# Patient Record
Sex: MALE | Race: WHITE | NOT HISPANIC OR LATINO
[De-identification: names, ages, dates, MRNs, and addresses within clinical notes are randomized per-mention and may not be internally consistent; named-entity substitution may affect disease eponyms.]

---

## 2017-07-11 ENCOUNTER — IMAGING SERVICES (OUTPATIENT)
Dept: OTHER | Age: 27
End: 2017-07-11

## 2017-07-11 ENCOUNTER — CHARTING TRANS (OUTPATIENT)
Dept: OTHER | Age: 27
End: 2017-07-11

## 2017-07-11 ASSESSMENT — PAIN SCALES - GENERAL: PAINLEVEL_OUTOF10: 6

## 2017-12-06 ENCOUNTER — IMAGING SERVICES (OUTPATIENT)
Dept: OTHER | Age: 27
End: 2017-12-06

## 2017-12-06 ENCOUNTER — CHARTING TRANS (OUTPATIENT)
Dept: OTHER | Age: 27
End: 2017-12-06

## 2017-12-06 ASSESSMENT — PAIN SCALES - GENERAL: PAINLEVEL_OUTOF10: 8

## 2018-05-25 ENCOUNTER — HOSPITAL (OUTPATIENT)
Dept: OTHER | Age: 28
End: 2018-05-25

## 2018-05-25 ENCOUNTER — IMAGING SERVICES (OUTPATIENT)
Dept: OTHER | Age: 28
End: 2018-05-25

## 2018-08-23 ENCOUNTER — CHARTING TRANS (OUTPATIENT)
Dept: OTHER | Age: 28
End: 2018-08-23

## 2018-11-02 VITALS — TEMPERATURE: 98.5 F | RESPIRATION RATE: 18 BRPM | HEART RATE: 78 BPM

## 2018-11-03 VITALS — TEMPERATURE: 98.5 F | RESPIRATION RATE: 18 BRPM | HEART RATE: 78 BPM

## 2018-11-27 VITALS
OXYGEN SATURATION: 98 % | BODY MASS INDEX: 26.94 KG/M2 | HEIGHT: 74 IN | RESPIRATION RATE: 18 BRPM | HEART RATE: 69 BPM | TEMPERATURE: 98.1 F | WEIGHT: 209.88 LBS

## 2018-12-26 RX ORDER — FLUTICASONE PROPIONATE 50 MCG
SPRAY, SUSPENSION (ML) NASAL
COMMUNITY
Start: 2018-08-23 | End: 2021-04-21

## 2018-12-26 RX ORDER — BENZONATATE 200 MG/1
CAPSULE ORAL
COMMUNITY
Start: 2018-08-23 | End: 2019-08-23

## 2019-01-01 ENCOUNTER — EXTERNAL RECORD (OUTPATIENT)
Dept: HEALTH INFORMATION MANAGEMENT | Facility: OTHER | Age: 29
End: 2019-01-01

## 2019-01-02 ENCOUNTER — OFFICE VISIT (OUTPATIENT)
Dept: FAMILY MEDICINE | Age: 29
End: 2019-01-02

## 2019-01-02 DIAGNOSIS — Z00.00 PREVENTATIVE HEALTH CARE: Primary | ICD-10-CM

## 2019-01-02 DIAGNOSIS — H65.23 BILATERAL CHRONIC SEROUS OTITIS MEDIA: ICD-10-CM

## 2019-01-02 PROCEDURE — 99395 PREV VISIT EST AGE 18-39: CPT | Performed by: FAMILY MEDICINE

## 2019-01-02 RX ORDER — FLUTICASONE PROPIONATE 50 MCG
1 SPRAY, SUSPENSION (ML) NASAL DAILY
Qty: 16 G | Refills: 12 | Status: SHIPPED | OUTPATIENT
Start: 2019-01-02 | End: 2020-04-21

## 2019-01-02 SDOH — HEALTH STABILITY: MENTAL HEALTH: HOW OFTEN DO YOU HAVE A DRINK CONTAINING ALCOHOL?: NEVER

## 2019-01-02 ASSESSMENT — ENCOUNTER SYMPTOMS
CONSTIPATION: 0
DIARRHEA: 0
FEVER: 0
SHORTNESS OF BREATH: 0
ADENOPATHY: 0

## 2019-01-02 ASSESSMENT — PATIENT HEALTH QUESTIONNAIRE - PHQ9
2. FEELING DOWN, DEPRESSED OR HOPELESS: NOT AT ALL
SUM OF ALL RESPONSES TO PHQ9 QUESTIONS 1 AND 2: 0
1. LITTLE INTEREST OR PLEASURE IN DOING THINGS: NOT AT ALL

## 2019-01-02 ASSESSMENT — PAIN SCALES - GENERAL: PAINLEVEL: 0

## 2019-01-24 ENCOUNTER — TELEPHONE (OUTPATIENT)
Dept: FAMILY MEDICINE | Age: 29
End: 2019-01-24

## 2019-01-26 ENCOUNTER — LAB SERVICES (OUTPATIENT)
Dept: LAB | Age: 29
End: 2019-01-26

## 2019-01-26 DIAGNOSIS — Z00.00 PREVENTATIVE HEALTH CARE: ICD-10-CM

## 2019-01-26 LAB
ALBUMIN SERPL-MCNC: 4.1 G/DL (ref 3.6–5.1)
ALBUMIN/GLOB SERPL: 1.3 {RATIO} (ref 1–2.4)
ALP SERPL-CCNC: 74 UNITS/L (ref 45–117)
ALT SERPL-CCNC: 43 UNITS/L
ANION GAP SERPL CALC-SCNC: 12 MMOL/L (ref 10–20)
AST SERPL-CCNC: 22 UNITS/L
BASOPHILS # BLD AUTO: 0.1 K/MCL (ref 0–0.3)
BASOPHILS NFR BLD AUTO: 1 %
BILIRUB SERPL-MCNC: 0.7 MG/DL (ref 0.2–1)
BUN SERPL-MCNC: 20 MG/DL (ref 6–20)
BUN/CREAT SERPL: 15 (ref 7–25)
CALCIUM SERPL-MCNC: 9.1 MG/DL (ref 8.4–10.2)
CHLORIDE SERPL-SCNC: 106 MMOL/L (ref 98–107)
CHOLEST SERPL-MCNC: 189 MG/DL
CHOLEST/HDLC SERPL: 4.4 {RATIO}
CO2 SERPL-SCNC: 27 MMOL/L (ref 21–32)
CREAT SERPL-MCNC: 1.3 MG/DL (ref 0.67–1.17)
DIFFERENTIAL METHOD BLD: NORMAL
EOSINOPHIL # BLD AUTO: 0.1 K/MCL (ref 0.1–0.5)
EOSINOPHIL NFR SPEC: 1 %
ERYTHROCYTE [DISTWIDTH] IN BLOOD: 11.9 % (ref 11–15)
FASTING STATUS PATIENT QL REPORTED: 12 HRS
GLOBULIN SER-MCNC: 3.1 G/DL (ref 2–4)
GLUCOSE SERPL-MCNC: 86 MG/DL (ref 65–99)
HCT VFR BLD CALC: 48.1 % (ref 39–51)
HDLC SERPL-MCNC: 43 MG/DL
HGB BLD-MCNC: 16.5 G/DL (ref 13–17)
IMM GRANULOCYTES # BLD AUTO: 0 K/MCL (ref 0–0.2)
IMM GRANULOCYTES NFR BLD: 0 %
LDLC SERPL-MCNC: 114 MG/DL
LENGTH OF FAST TIME PATIENT: 12 HRS
LYMPHOCYTES # BLD MANUAL: 1.9 K/MCL (ref 1–4.8)
LYMPHOCYTES NFR BLD MANUAL: 22 %
MCH RBC QN AUTO: 30.8 PG (ref 26–34)
MCHC RBC AUTO-ENTMCNC: 34.3 G/DL (ref 32–36.5)
MCV RBC AUTO: 89.7 FL (ref 78–100)
MONOCYTES # BLD MANUAL: 0.7 K/MCL (ref 0.3–0.9)
MONOCYTES NFR BLD MANUAL: 8 %
NEUTROPHILS # BLD: 5.6 K/MCL (ref 1.8–7.7)
NEUTROPHILS NFR BLD AUTO: 68 %
NONHDLC SERPL-MCNC: 146 MG/DL
NRBC BLD MANUAL-RTO: 0 /100 WBC
PLATELET # BLD: 146 K/MCL (ref 140–450)
POTASSIUM SERPL-SCNC: 4.2 MMOL/L (ref 3.4–5.1)
PROT SERPL-MCNC: 7.2 G/DL (ref 6.4–8.2)
RBC # BLD: 5.36 MIL/MCL (ref 4.5–5.9)
SODIUM SERPL-SCNC: 141 MMOL/L (ref 135–145)
TRIGL SERPL-MCNC: 160 MG/DL
WBC # BLD: 8.3 K/MCL (ref 4.2–11)

## 2019-01-26 PROCEDURE — 80061 LIPID PANEL: CPT | Performed by: FAMILY MEDICINE

## 2019-01-26 PROCEDURE — 85025 COMPLETE CBC W/AUTO DIFF WBC: CPT | Performed by: FAMILY MEDICINE

## 2019-01-26 PROCEDURE — 36415 COLL VENOUS BLD VENIPUNCTURE: CPT | Performed by: FAMILY MEDICINE

## 2019-01-26 PROCEDURE — 80053 COMPREHEN METABOLIC PANEL: CPT | Performed by: FAMILY MEDICINE

## 2019-03-20 LAB — PATHOLOGY ICD PATIENT: NORMAL

## 2020-01-01 ENCOUNTER — EXTERNAL RECORD (OUTPATIENT)
Dept: HEALTH INFORMATION MANAGEMENT | Facility: OTHER | Age: 30
End: 2020-01-01

## 2020-01-10 ENCOUNTER — HOSPITAL (OUTPATIENT)
Dept: OTHER | Age: 30
End: 2020-01-10

## 2020-01-10 LAB
ALBUMIN SERPL-MCNC: 4.1 G/DL (ref 3.6–5.1)
ALBUMIN/GLOB SERPL: 1.2 {RATIO} (ref 1–2.4)
ALP SERPL-CCNC: 76 UNITS/L (ref 45–117)
ALT SERPL-CCNC: 38 UNITS/L
AMORPH SED URNS QL MICRO: ABNORMAL
ANION GAP SERPL CALC-SCNC: 7 MMOL/L (ref 10–20)
APPEARANCE UR: CLEAR
AST SERPL-CCNC: 21 UNITS/L
BILIRUB SERPL-MCNC: 0.8 MG/DL (ref 0.2–1)
BILIRUB UR QL: NEGATIVE
BUN SERPL-MCNC: 17 MG/DL (ref 6–20)
BUN/CREAT SERPL: 14 (ref 7–25)
CALCIUM SERPL-MCNC: 9.2 MG/DL (ref 8.4–10.2)
CAOX CRY URNS QL MICRO: ABNORMAL
CHLORIDE SERPL-SCNC: 104 MMOL/L (ref 98–107)
CO2 SERPL-SCNC: 29 MMOL/L (ref 21–32)
COLOR UR: ABNORMAL
CREAT SERPL-MCNC: 1.19 MG/DL (ref 0.67–1.17)
CREAT UR-MCNC: 85.6 MG/DL
EPITH CASTS #/AREA URNS LPF: ABNORMAL /[LPF]
FATTY CASTS #/AREA URNS LPF: ABNORMAL /[LPF]
GLOBULIN SER-MCNC: 3.5 G/DL (ref 2–4)
GLUCOSE SERPL-MCNC: 91 MG/DL (ref 65–99)
GLUCOSE UR-MCNC: NEGATIVE MG/DL
GRAN CASTS #/AREA URNS LPF: ABNORMAL /[LPF]
HGB UR QL: NEGATIVE
HYALINE CASTS #/AREA URNS LPF: ABNORMAL /[LPF]
KETONES UR-MCNC: NEGATIVE MG/DL
LENGTH OF FAST TIME PATIENT: 12 HRS
LEUKOCYTE ESTERASE UR QL STRIP: NEGATIVE
MICROALBUMIN UR-MCNC: <0.5 MG/DL
MICROALBUMIN/CREAT UR: NORMAL MG/G
MICROSCOPIC (MT): ABNORMAL
MIXED CELL CASTS #/AREA URNS LPF: ABNORMAL /[LPF]
MUCOUS THREADS URNS QL MICRO: ABNORMAL
NITRITE UR QL: NEGATIVE
PH UR: 7 UNITS (ref 5–7)
POTASSIUM SERPL-SCNC: 4.3 MMOL/L (ref 3.4–5.1)
PROT SERPL-MCNC: 7.6 G/DL (ref 6.4–8.2)
PROT UR QL: NEGATIVE MG/DL
RBC CASTS #/AREA URNS LPF: ABNORMAL /[LPF]
RENAL EPI CELLS #/AREA URNS HPF: ABNORMAL /[HPF]
SODIUM SERPL-SCNC: 136 MMOL/L (ref 135–145)
SP GR UR: 1.01 (ref 1–1.03)
SPECIMEN SOURCE: ABNORMAL
SPERM URNS QL MICRO: ABNORMAL
T VAGINALIS URNS QL MICRO: ABNORMAL
TRI-PHOS CRY URNS QL MICRO: ABNORMAL
URATE CRY URNS QL MICRO: ABNORMAL
URNS CMNT MICRO: ABNORMAL
UROBILINOGEN UR QL: 0.2 MG/DL (ref 0–1)
WAXY CASTS #/AREA URNS LPF: ABNORMAL /[LPF]
WBC CASTS #/AREA URNS LPF: ABNORMAL /[LPF]
YEAST HYPHAE URNS QL MICRO: ABNORMAL
YEAST URNS QL MICRO: ABNORMAL

## 2020-03-07 ENCOUNTER — HOSPITAL ENCOUNTER (OUTPATIENT)
Age: 30
Discharge: HOME | End: 2020-03-07
Attending: EMERGENCY MEDICINE
Payer: COMMERCIAL

## 2020-03-07 VITALS
BODY MASS INDEX: 26.95 KG/M2 | OXYGEN SATURATION: 99 % | DIASTOLIC BLOOD PRESSURE: 73 MMHG | WEIGHT: 210 LBS | TEMPERATURE: 98 F | HEIGHT: 74 IN | HEART RATE: 73 BPM | SYSTOLIC BLOOD PRESSURE: 121 MMHG | RESPIRATION RATE: 16 BRPM

## 2020-03-07 DIAGNOSIS — J06.9 VIRAL UPPER RESPIRATORY TRACT INFECTION: Primary | ICD-10-CM

## 2020-03-07 LAB — S PYO AG THROAT QL: NEGATIVE

## 2020-03-07 PROCEDURE — 87081 CULTURE SCREEN ONLY: CPT | Performed by: EMERGENCY MEDICINE

## 2020-03-07 PROCEDURE — 99202 OFFICE O/P NEW SF 15 MIN: CPT

## 2020-03-07 PROCEDURE — 87430 STREP A AG IA: CPT

## 2020-03-07 PROCEDURE — 99204 OFFICE O/P NEW MOD 45 MIN: CPT

## 2020-03-07 NOTE — ED INITIAL ASSESSMENT (HPI)
Patient reports he has been exposed to strep. Patient reports his wife was diagnosed with strep and his son has an ear infection.

## 2020-03-07 NOTE — ED PROVIDER NOTES
Patient Seen in: [address]    History   Patient presents with:  Sore Throat    Stated Complaint: sore throat    HPI    Patient here with cough, congestion for 2 days. No travel, no known sick contacts.   Patient denies rhinonsinusitis vs. Bronchitis vs. Pneumonia         ED Course     Labs Reviewed   [facility] POCT RAPID STREP - Normal       MDM     Radiology:        Disposition and Plan     Clinical Impression:  Viral upper respiratory tract infection  (primary encounter diag

## 2020-04-21 RX ORDER — FLUTICASONE PROPIONATE 50 MCG
SPRAY, SUSPENSION (ML) NASAL
Qty: 16 G | Refills: 12 | Status: SHIPPED | OUTPATIENT
Start: 2020-04-21 | End: 2021-07-26

## 2020-12-28 ENCOUNTER — TELEPHONE (OUTPATIENT)
Dept: SCHEDULING | Age: 30
End: 2020-12-28

## 2021-01-06 ENCOUNTER — OFFICE VISIT (OUTPATIENT)
Dept: FAMILY MEDICINE | Age: 31
End: 2021-01-06

## 2021-01-06 DIAGNOSIS — R79.89 ELEVATED SERUM CREATININE: Primary | ICD-10-CM

## 2021-01-06 PROCEDURE — 99395 PREV VISIT EST AGE 18-39: CPT | Performed by: FAMILY MEDICINE

## 2021-01-06 SDOH — HEALTH STABILITY: MENTAL HEALTH: HOW OFTEN DO YOU HAVE A DRINK CONTAINING ALCOHOL?: NEVER

## 2021-01-06 ASSESSMENT — PATIENT HEALTH QUESTIONNAIRE - PHQ9
2. FEELING DOWN, DEPRESSED OR HOPELESS: NOT AT ALL
CLINICAL INTERPRETATION OF PHQ9 SCORE: NO FURTHER SCREENING NEEDED
CLINICAL INTERPRETATION OF PHQ2 SCORE: NO FURTHER SCREENING NEEDED
1. LITTLE INTEREST OR PLEASURE IN DOING THINGS: NOT AT ALL
SUM OF ALL RESPONSES TO PHQ9 QUESTIONS 1 AND 2: 0
SUM OF ALL RESPONSES TO PHQ9 QUESTIONS 1 AND 2: 0

## 2021-01-06 ASSESSMENT — PAIN SCALES - GENERAL: PAINLEVEL: 0

## 2021-01-06 ASSESSMENT — ENCOUNTER SYMPTOMS
SHORTNESS OF BREATH: 0
ADENOPATHY: 0
CONSTIPATION: 0
DIARRHEA: 0
FEVER: 0

## 2021-03-05 ENCOUNTER — TELEPHONE (OUTPATIENT)
Dept: FAMILY MEDICINE | Age: 31
End: 2021-03-05

## 2021-03-09 ENCOUNTER — TELEPHONE (OUTPATIENT)
Dept: ULTRASOUND IMAGING | Age: 31
End: 2021-03-09

## 2021-03-09 DIAGNOSIS — Z00.00 ENCOUNTER FOR SCREENING AND PREVENTATIVE CARE: Primary | ICD-10-CM

## 2021-03-11 ENCOUNTER — LAB SERVICES (OUTPATIENT)
Dept: LAB | Age: 31
End: 2021-03-11

## 2021-03-11 DIAGNOSIS — Z00.00 ENCOUNTER FOR SCREENING AND PREVENTATIVE CARE: ICD-10-CM

## 2021-03-11 LAB — HIV 1+2 AB+HIV1 P24 AG SERPL QL IA: NONREACTIVE

## 2021-03-11 PROCEDURE — 87389 HIV-1 AG W/HIV-1&-2 AB AG IA: CPT | Performed by: FAMILY MEDICINE

## 2021-03-11 PROCEDURE — 36415 COLL VENOUS BLD VENIPUNCTURE: CPT | Performed by: FAMILY MEDICINE

## 2021-05-25 VITALS
BODY MASS INDEX: 28.36 KG/M2 | HEART RATE: 67 BPM | BODY MASS INDEX: 26.95 KG/M2 | TEMPERATURE: 98.3 F | SYSTOLIC BLOOD PRESSURE: 131 MMHG | RESPIRATION RATE: 18 BRPM | HEIGHT: 74 IN | WEIGHT: 210 LBS | DIASTOLIC BLOOD PRESSURE: 78 MMHG | WEIGHT: 221.01 LBS | TEMPERATURE: 97.8 F | OXYGEN SATURATION: 97 % | HEIGHT: 74 IN | HEART RATE: 65 BPM | DIASTOLIC BLOOD PRESSURE: 88 MMHG | SYSTOLIC BLOOD PRESSURE: 136 MMHG | OXYGEN SATURATION: 99 % | RESPIRATION RATE: 18 BRPM

## 2021-07-26 RX ORDER — FLUTICASONE PROPIONATE 50 MCG
SPRAY, SUSPENSION (ML) NASAL
Qty: 16 G | Refills: 3 | Status: SHIPPED | OUTPATIENT
Start: 2021-07-26 | End: 2021-12-23

## 2021-11-11 ENCOUNTER — HOSPITAL ENCOUNTER (OUTPATIENT)
Age: 31
Discharge: HOME | End: 2021-11-11
Attending: EMERGENCY MEDICINE
Payer: COMMERCIAL

## 2021-11-11 VITALS
SYSTOLIC BLOOD PRESSURE: 146 MMHG | DIASTOLIC BLOOD PRESSURE: 84 MMHG | WEIGHT: 215 LBS | TEMPERATURE: 97 F | BODY MASS INDEX: 27.59 KG/M2 | RESPIRATION RATE: 18 BRPM | HEART RATE: 68 BPM | HEIGHT: 74 IN | OXYGEN SATURATION: 99 %

## 2021-11-11 DIAGNOSIS — R53.81 MALAISE: Primary | ICD-10-CM

## 2021-11-11 PROCEDURE — 99203 OFFICE O/P NEW LOW 30 MIN: CPT

## 2021-11-11 PROCEDURE — 84439 ASSAY OF FREE THYROXINE: CPT | Performed by: EMERGENCY MEDICINE

## 2021-11-11 PROCEDURE — 99204 OFFICE O/P NEW MOD 45 MIN: CPT

## 2021-11-11 PROCEDURE — 82550 ASSAY OF CK (CPK): CPT | Performed by: EMERGENCY MEDICINE

## 2021-11-11 PROCEDURE — 93005 ELECTROCARDIOGRAM TRACING: CPT

## 2021-11-11 PROCEDURE — 85025 COMPLETE CBC W/AUTO DIFF WBC: CPT | Performed by: EMERGENCY MEDICINE

## 2021-11-11 PROCEDURE — 93010 ELECTROCARDIOGRAM REPORT: CPT

## 2021-11-11 PROCEDURE — 36415 COLL VENOUS BLD VENIPUNCTURE: CPT

## 2021-11-11 PROCEDURE — 80053 COMPREHEN METABOLIC PANEL: CPT | Performed by: EMERGENCY MEDICINE

## 2021-11-11 PROCEDURE — 84443 ASSAY THYROID STIM HORMONE: CPT | Performed by: EMERGENCY MEDICINE

## 2021-11-11 NOTE — ED PROVIDER NOTES
Patient Seen in: [facility]      History   Patient presents with:  Fatigue    Stated Complaint: headache    Subjective:   HPI    25-year-old male presents to the emergency department complaining of a weeklong history of fogginess and a fe erythema without soft tissue swelling of the left external auditory canal.  No tenderness. Right TM and EAC are normal.  Neck: No adenopathy or thyromegaly. No hoarseness or stridor. Lungs are clear to auscultation.   Heart exam: Normal S1-S2 without ext day            Medications Prescribed:  There are no discharge medications for this patient.

## 2021-11-11 NOTE — ED INITIAL ASSESSMENT (HPI)
Patient presents to IC with c/o feeling \"foggy\"and \"off\"for the last week or so,Stressful job. Very regimented. Denies chest  Pain or sob.

## 2021-12-23 RX ORDER — FLUTICASONE PROPIONATE 50 MCG
SPRAY, SUSPENSION (ML) NASAL
Qty: 16 G | Refills: 3 | Status: SHIPPED | OUTPATIENT
Start: 2021-12-23

## 2022-01-01 ENCOUNTER — HOSPITAL ENCOUNTER (OUTPATIENT)
Age: 32
Discharge: HOME | End: 2022-01-01
Payer: COMMERCIAL

## 2022-01-01 ENCOUNTER — EXTERNAL RECORD (OUTPATIENT)
Dept: OTHER | Age: 32
End: 2022-01-01

## 2022-01-01 DIAGNOSIS — Z20.822 ENCOUNTER FOR LABORATORY TESTING FOR COVID-19 VIRUS: Primary | ICD-10-CM

## 2022-01-01 NOTE — ED INITIAL ASSESSMENT (HPI)
Patient presents for asymptomatic covid testing. Denies any complaints.  States needs test for return to [employer]

## 2022-01-03 LAB — SARS-COV-2 RNA RESP QL NAA+PROBE: NOT DETECTED

## 2022-01-10 ENCOUNTER — OFFICE VISIT (OUTPATIENT)
Dept: FAMILY MEDICINE | Age: 32
End: 2022-01-10

## 2022-01-10 VITALS
HEIGHT: 74 IN | RESPIRATION RATE: 18 BRPM | DIASTOLIC BLOOD PRESSURE: 80 MMHG | SYSTOLIC BLOOD PRESSURE: 127 MMHG | TEMPERATURE: 98.1 F | BODY MASS INDEX: 28.14 KG/M2 | OXYGEN SATURATION: 97 % | WEIGHT: 219.25 LBS | HEART RATE: 60 BPM

## 2022-01-10 DIAGNOSIS — N18.2 CHRONIC KIDNEY DISEASE (CKD), STAGE II (MILD): ICD-10-CM

## 2022-01-10 DIAGNOSIS — Z00.00 PREVENTATIVE HEALTH CARE: Primary | ICD-10-CM

## 2022-01-10 DIAGNOSIS — L98.9 SKIN LESIONS, GENERALIZED: ICD-10-CM

## 2022-01-10 PROCEDURE — 99395 PREV VISIT EST AGE 18-39: CPT | Performed by: FAMILY MEDICINE

## 2022-01-10 RX ORDER — CLINDAMYCIN PHOSPHATE 10 MG/G
GEL TOPICAL
COMMUNITY
Start: 2021-12-23

## 2022-01-10 ASSESSMENT — ENCOUNTER SYMPTOMS
DIARRHEA: 0
SHORTNESS OF BREATH: 0
FEVER: 0
ADENOPATHY: 0
CONSTIPATION: 0

## 2022-01-10 ASSESSMENT — PATIENT HEALTH QUESTIONNAIRE - PHQ9
1. LITTLE INTEREST OR PLEASURE IN DOING THINGS: NOT AT ALL
2. FEELING DOWN, DEPRESSED OR HOPELESS: NOT AT ALL
CLINICAL INTERPRETATION OF PHQ2 SCORE: NO FURTHER SCREENING NEEDED
SUM OF ALL RESPONSES TO PHQ9 QUESTIONS 1 AND 2: 0
SUM OF ALL RESPONSES TO PHQ9 QUESTIONS 1 AND 2: 0

## 2022-01-10 ASSESSMENT — PAIN SCALES - GENERAL: PAINLEVEL: 0

## 2022-02-17 LAB
CHOLEST SERPL-MCNC: 190 MG/DL
CHOLEST/HDLC SERPL: 4.6 (CALC)
HDLC SERPL-MCNC: 41 MG/DL
LDLC SERPL CALC-MCNC: 115 MG/DL (CALC)
LENGTH OF FAST TIME PATIENT: YES H
NONHDLC SERPL-MCNC: 149 MG/DL (CALC)
TRIGL SERPL-MCNC: 227 MG/DL

## 2022-02-22 ENCOUNTER — TELEPHONE (OUTPATIENT)
Dept: FAMILY MEDICINE | Age: 32
End: 2022-02-22

## 2022-03-02 ENCOUNTER — EXTERNAL RECORD (OUTPATIENT)
Dept: HEALTH INFORMATION MANAGEMENT | Facility: OTHER | Age: 32
End: 2022-03-02

## 2022-06-02 ENCOUNTER — OFFICE VISIT (OUTPATIENT)
Dept: INTEGRATIVE MEDICINE | Facility: CLINIC | Age: 32
End: 2022-06-02
Payer: COMMERCIAL

## 2022-06-02 VITALS
BODY MASS INDEX: 27.21 KG/M2 | HEIGHT: 74 IN | DIASTOLIC BLOOD PRESSURE: 64 MMHG | SYSTOLIC BLOOD PRESSURE: 110 MMHG | HEART RATE: 67 BPM | OXYGEN SATURATION: 96 % | WEIGHT: 212 LBS

## 2022-06-02 DIAGNOSIS — H65.93 MIDDLE EAR EFFUSION, BILATERAL: ICD-10-CM

## 2022-06-02 DIAGNOSIS — J30.89 ALLERGIC RHINITIS DUE TO OTHER ALLERGIC TRIGGER, UNSPECIFIED SEASONALITY: ICD-10-CM

## 2022-06-02 DIAGNOSIS — Z30.09 FAMILY PLANNING: ICD-10-CM

## 2022-06-02 DIAGNOSIS — R21 RASH: ICD-10-CM

## 2022-06-02 DIAGNOSIS — Z00.00 WELL ADULT EXAM: Primary | ICD-10-CM

## 2022-06-02 DIAGNOSIS — H65.90 FLUID LEVEL BEHIND TYMPANIC MEMBRANE, UNSPECIFIED LATERALITY: ICD-10-CM

## 2022-06-02 PROCEDURE — 3008F BODY MASS INDEX DOCD: CPT | Performed by: FAMILY MEDICINE

## 2022-06-02 PROCEDURE — 3074F SYST BP LT 130 MM HG: CPT | Performed by: FAMILY MEDICINE

## 2022-06-02 PROCEDURE — 99203 OFFICE O/P NEW LOW 30 MIN: CPT | Performed by: FAMILY MEDICINE

## 2022-06-02 PROCEDURE — 99385 PREV VISIT NEW AGE 18-39: CPT | Performed by: FAMILY MEDICINE

## 2022-06-02 PROCEDURE — 3078F DIAST BP <80 MM HG: CPT | Performed by: FAMILY MEDICINE

## 2022-06-02 RX ORDER — FLUTICASONE PROPIONATE 50 MCG
1 SPRAY, SUSPENSION (ML) NASAL DAILY
COMMUNITY
Start: 2022-05-28

## 2022-06-02 RX ORDER — CLINDAMYCIN PHOSPHATE 10 MG/G
GEL TOPICAL
COMMUNITY
Start: 2022-05-24

## 2022-06-09 ENCOUNTER — LAB ENCOUNTER (OUTPATIENT)
Dept: LAB | Facility: REFERENCE LAB | Age: 32
End: 2022-06-09
Attending: FAMILY MEDICINE
Payer: COMMERCIAL

## 2022-06-09 DIAGNOSIS — R21 RASH: ICD-10-CM

## 2022-06-09 DIAGNOSIS — H65.90 FLUID LEVEL BEHIND TYMPANIC MEMBRANE, UNSPECIFIED LATERALITY: ICD-10-CM

## 2022-06-09 DIAGNOSIS — H65.93 MIDDLE EAR EFFUSION, BILATERAL: ICD-10-CM

## 2022-06-09 DIAGNOSIS — Z00.00 WELL ADULT EXAM: ICD-10-CM

## 2022-06-09 LAB
ALBUMIN SERPL-MCNC: 3.9 G/DL (ref 3.4–5)
ALBUMIN/GLOB SERPL: 1.1 {RATIO} (ref 1–2)
ALP LIVER SERPL-CCNC: 73 U/L
ALT SERPL-CCNC: 40 U/L
ANION GAP SERPL CALC-SCNC: 7 MMOL/L (ref 0–18)
AST SERPL-CCNC: 21 U/L (ref 15–37)
BASOPHILS # BLD AUTO: 0.04 X10(3) UL (ref 0–0.2)
BASOPHILS NFR BLD AUTO: 0.7 %
BILIRUB SERPL-MCNC: 0.7 MG/DL (ref 0.1–2)
BUN BLD-MCNC: 16 MG/DL (ref 7–18)
BUN/CREAT SERPL: 12.6 (ref 10–20)
CALCIUM BLD-MCNC: 9.6 MG/DL (ref 8.5–10.1)
CHLORIDE SERPL-SCNC: 107 MMOL/L (ref 98–112)
CHOLEST SERPL-MCNC: 196 MG/DL (ref ?–200)
CO2 SERPL-SCNC: 26 MMOL/L (ref 21–32)
CREAT BLD-MCNC: 1.27 MG/DL
DEPRECATED RDW RBC AUTO: 41.4 FL (ref 35.1–46.3)
EOSINOPHIL # BLD AUTO: 0.02 X10(3) UL (ref 0–0.7)
EOSINOPHIL NFR BLD AUTO: 0.3 %
ERYTHROCYTE [DISTWIDTH] IN BLOOD BY AUTOMATED COUNT: 12.5 % (ref 11–15)
EST. AVERAGE GLUCOSE BLD GHB EST-MCNC: 114 MG/DL (ref 68–126)
FASTING PATIENT LIPID ANSWER: YES
FASTING STATUS PATIENT QL REPORTED: YES
GLOBULIN PLAS-MCNC: 3.7 G/DL (ref 2.8–4.4)
GLUCOSE BLD-MCNC: 97 MG/DL (ref 70–99)
HBA1C MFR BLD: 5.6 % (ref ?–5.7)
HCT VFR BLD AUTO: 46.6 %
HDLC SERPL-MCNC: 46 MG/DL (ref 40–59)
HGB BLD-MCNC: 15.7 G/DL
IMM GRANULOCYTES # BLD AUTO: 0.01 X10(3) UL (ref 0–1)
IMM GRANULOCYTES NFR BLD: 0.2 %
LDLC SERPL CALC-MCNC: 126 MG/DL (ref ?–100)
LYMPHOCYTES # BLD AUTO: 1.83 X10(3) UL (ref 1–4)
LYMPHOCYTES NFR BLD AUTO: 30.1 %
MCH RBC QN AUTO: 30.7 PG (ref 26–34)
MCHC RBC AUTO-ENTMCNC: 33.7 G/DL (ref 31–37)
MCV RBC AUTO: 91.2 FL
MONOCYTES # BLD AUTO: 0.45 X10(3) UL (ref 0.1–1)
MONOCYTES NFR BLD AUTO: 7.4 %
NEUTROPHILS # BLD AUTO: 3.72 X10 (3) UL (ref 1.5–7.7)
NEUTROPHILS # BLD AUTO: 3.72 X10(3) UL (ref 1.5–7.7)
NEUTROPHILS NFR BLD AUTO: 61.3 %
NONHDLC SERPL-MCNC: 150 MG/DL (ref ?–130)
OSMOLALITY SERPL CALC.SUM OF ELEC: 291 MOSM/KG (ref 275–295)
PLATELET # BLD AUTO: 159 10(3)UL (ref 150–450)
POTASSIUM SERPL-SCNC: 4.6 MMOL/L (ref 3.5–5.1)
PROT SERPL-MCNC: 7.6 G/DL (ref 6.4–8.2)
RBC # BLD AUTO: 5.11 X10(6)UL
SODIUM SERPL-SCNC: 140 MMOL/L (ref 136–145)
T4 FREE SERPL-MCNC: 1 NG/DL (ref 0.8–1.7)
TRIGL SERPL-MCNC: 136 MG/DL (ref 30–149)
TSI SER-ACNC: 1.94 MIU/ML (ref 0.36–3.74)
VIT D+METAB SERPL-MCNC: 62.4 NG/ML (ref 30–100)
VLDLC SERPL CALC-MCNC: 24 MG/DL (ref 0–30)
WBC # BLD AUTO: 6.1 X10(3) UL (ref 4–11)

## 2022-06-09 PROCEDURE — 80053 COMPREHEN METABOLIC PANEL: CPT

## 2022-06-09 PROCEDURE — 85025 COMPLETE CBC W/AUTO DIFF WBC: CPT

## 2022-06-09 PROCEDURE — 84443 ASSAY THYROID STIM HORMONE: CPT

## 2022-06-09 PROCEDURE — 84439 ASSAY OF FREE THYROXINE: CPT

## 2022-06-09 PROCEDURE — 80061 LIPID PANEL: CPT

## 2022-06-09 PROCEDURE — 82306 VITAMIN D 25 HYDROXY: CPT

## 2022-06-09 PROCEDURE — 83036 HEMOGLOBIN GLYCOSYLATED A1C: CPT

## 2022-06-09 PROCEDURE — 86003 ALLG SPEC IGE CRUDE XTRC EA: CPT

## 2022-06-09 PROCEDURE — 82785 ASSAY OF IGE: CPT

## 2022-06-09 PROCEDURE — 36415 COLL VENOUS BLD VENIPUNCTURE: CPT

## 2022-06-13 LAB
CLAM IGE QN: <0.1 KUA/L (ref ?–0.1)
CODFISH IGE QN: <0.1 KUA/L (ref ?–0.1)
CORN IGE QN: <0.1 KUA/L (ref ?–0.1)
COW MILK IGE QN: <0.1 KUA/L (ref ?–0.1)
EGG WHITE IGE QN: <0.1 KUA/L (ref ?–0.1)
IGE SERPL-ACNC: <2 KU/L (ref 2–214)
PEANUT IGE QN: <0.1 KUA/L (ref ?–0.1)
SCALLOP IGE QN: <0.1 KUA/L (ref ?–0.1)
SESAME SEED IGE QN: <0.1 KUA/L (ref ?–0.1)
SHRIMP IGE QN: <0.1 KUA/L (ref ?–0.1)
SOYBEAN IGE QN: <0.1 KUA/L (ref ?–0.1)
WALNUT IGE QN: <0.1 KUA/L (ref ?–0.1)
WHEAT IGE QN: <0.1 KUA/L (ref ?–0.1)

## 2022-06-14 LAB

## 2022-06-21 ENCOUNTER — OFFICE VISIT (OUTPATIENT)
Dept: OTOLARYNGOLOGY | Facility: CLINIC | Age: 32
End: 2022-06-21
Payer: COMMERCIAL

## 2022-06-21 VITALS — TEMPERATURE: 97 F

## 2022-06-21 DIAGNOSIS — R09.82 POSTNASAL DISCHARGE: Primary | ICD-10-CM

## 2022-06-21 DIAGNOSIS — H93.8X3 SENSATION OF FULLNESS IN BOTH EARS: ICD-10-CM

## 2022-06-21 DIAGNOSIS — J34.2 DEVIATED NASAL SEPTUM: ICD-10-CM

## 2022-06-21 PROCEDURE — 99203 OFFICE O/P NEW LOW 30 MIN: CPT | Performed by: OTOLARYNGOLOGY

## 2022-06-21 RX ORDER — MONTELUKAST SODIUM 10 MG/1
10 TABLET ORAL NIGHTLY
Qty: 30 TABLET | Refills: 3 | Status: SHIPPED | OUTPATIENT
Start: 2022-06-21

## 2022-06-21 RX ORDER — PSEUDOEPHEDRINE HCL 120 MG/1
120 TABLET, FILM COATED, EXTENDED RELEASE ORAL EVERY 12 HOURS
Qty: 60 TABLET | Refills: 3 | Status: SHIPPED | OUTPATIENT
Start: 2022-06-21

## 2022-06-21 RX ORDER — AZELASTINE 1 MG/ML
2 SPRAY, METERED NASAL 2 TIMES DAILY
Qty: 30 ML | Refills: 3 | Status: SHIPPED | OUTPATIENT
Start: 2022-06-21

## 2022-07-01 ENCOUNTER — TELEMEDICINE (OUTPATIENT)
Dept: INTEGRATIVE MEDICINE | Facility: CLINIC | Age: 32
End: 2022-07-01

## 2022-07-01 RX ORDER — PSEUDOEPHEDRINE HCL 120 MG/1
TABLET, FILM COATED, EXTENDED RELEASE ORAL
COMMUNITY
Start: 2022-06-21 | End: 2022-07-01

## 2022-07-15 ENCOUNTER — TELEMEDICINE (OUTPATIENT)
Dept: INTEGRATIVE MEDICINE | Facility: CLINIC | Age: 32
End: 2022-07-15

## 2022-07-15 DIAGNOSIS — J30.89 ALLERGIC RHINITIS DUE TO OTHER ALLERGIC TRIGGER, UNSPECIFIED SEASONALITY: Primary | ICD-10-CM

## 2022-07-15 DIAGNOSIS — D80.3 SELECTIVE DEFICIENCY OF IGG (HCC): ICD-10-CM

## 2022-07-15 DIAGNOSIS — H65.93 MIDDLE EAR EFFUSION, BILATERAL: ICD-10-CM

## 2022-07-15 PROCEDURE — 99213 OFFICE O/P EST LOW 20 MIN: CPT | Performed by: FAMILY MEDICINE

## 2022-07-15 RX ORDER — CLINDAMYCIN PHOSPHATE 10 UG/ML
LOTION TOPICAL
COMMUNITY
Start: 2022-07-12

## 2022-07-18 ENCOUNTER — TELEPHONE (OUTPATIENT)
Dept: INTEGRATIVE MEDICINE | Facility: CLINIC | Age: 32
End: 2022-07-18

## 2022-07-18 ENCOUNTER — PATIENT MESSAGE (OUTPATIENT)
Dept: INTEGRATIVE MEDICINE | Facility: CLINIC | Age: 32
End: 2022-07-18

## 2022-07-18 DIAGNOSIS — J32.9 RECURRENT SINUS INFECTIONS: Primary | ICD-10-CM

## 2022-07-18 DIAGNOSIS — D80.3 SELECTIVE DEFICIENCY OF IGG (HCC): ICD-10-CM

## 2022-07-18 NOTE — TELEPHONE ENCOUNTER
Labs in database - they were under preference lab once you enter the codes. I've pended them for you to review and submit. You can delete \" [id number] [facility] \" order so there's no confusion with the patient or . Per Elbert Valentin from lab - if you use these often, please save them to your favorites.

## 2022-07-19 NOTE — TELEPHONE ENCOUNTER
From: Miki Nissen  To: Wilman Samaniego,   Sent: 7/18/2022 8:48 PM CDT  Subject: Marleni Garcia follow up visit     Dr Precious Gilbert, has my blood test request been sent in so I can go at anytime? Also, what was the nasal spray you recommended?   Thanks

## 2022-07-22 ENCOUNTER — OFFICE VISIT (OUTPATIENT)
Dept: OTOLARYNGOLOGY | Facility: CLINIC | Age: 32
End: 2022-07-22
Payer: COMMERCIAL

## 2022-07-22 VITALS — WEIGHT: 212 LBS | HEIGHT: 74 IN | BODY MASS INDEX: 27.21 KG/M2

## 2022-07-22 DIAGNOSIS — J34.2 DEVIATED NASAL SEPTUM: ICD-10-CM

## 2022-07-22 DIAGNOSIS — H93.8X3 SENSATION OF FULLNESS IN BOTH EARS: ICD-10-CM

## 2022-07-22 DIAGNOSIS — R09.82 POSTNASAL DISCHARGE: Primary | ICD-10-CM

## 2022-07-22 PROCEDURE — 99213 OFFICE O/P EST LOW 20 MIN: CPT | Performed by: OTOLARYNGOLOGY

## 2022-07-22 PROCEDURE — 3008F BODY MASS INDEX DOCD: CPT | Performed by: OTOLARYNGOLOGY

## 2022-07-26 ENCOUNTER — LAB ENCOUNTER (OUTPATIENT)
Dept: LAB | Facility: REFERENCE LAB | Age: 32
End: 2022-07-26
Attending: FAMILY MEDICINE
Payer: COMMERCIAL

## 2022-07-26 DIAGNOSIS — D80.3 SELECTIVE DEFICIENCY OF IGG (HCC): ICD-10-CM

## 2022-07-26 DIAGNOSIS — J32.9 RECURRENT SINUS INFECTIONS: ICD-10-CM

## 2022-07-26 LAB
IGA SERPL-MCNC: 297 MG/DL (ref 70–312)
IGM SERPL-MCNC: 74.7 MG/DL (ref 43–279)
IMMUNOGLOBULIN PNL SER-MCNC: 1150 MG/DL (ref 791–1643)

## 2022-07-26 PROCEDURE — 36415 COLL VENOUS BLD VENIPUNCTURE: CPT | Performed by: FAMILY MEDICINE

## 2022-07-26 PROCEDURE — 82784 ASSAY IGA/IGD/IGG/IGM EACH: CPT | Performed by: FAMILY MEDICINE

## 2022-07-26 PROCEDURE — 82787 IGG 1 2 3 OR 4 EACH: CPT

## 2022-07-26 PROCEDURE — 82784 ASSAY IGA/IGD/IGG/IGM EACH: CPT

## 2022-07-29 LAB
IMMUNOGLOBULIN G SUBCLASS 1: 657 MG/DL
IMMUNOGLOBULIN G SUBCLASS 2: 296 MG/DL
IMMUNOGLOBULIN G SUBCLASS 3: 65 MG/DL
IMMUNOGLOBULIN G SUBCLASS 4: 11 MG/DL

## 2022-08-09 ENCOUNTER — OFFICE VISIT (OUTPATIENT)
Dept: SURGERY | Facility: CLINIC | Age: 32
End: 2022-08-09
Payer: COMMERCIAL

## 2022-08-09 DIAGNOSIS — Z30.09 STERILIZATION CONSULT: Primary | ICD-10-CM

## 2022-08-09 PROCEDURE — 99243 OFF/OP CNSLTJ NEW/EST LOW 30: CPT | Performed by: UROLOGY

## 2022-08-16 ENCOUNTER — LAB REQUISITION (OUTPATIENT)
Dept: LAB | Facility: HOSPITAL | Age: 32
End: 2022-08-16
Payer: COMMERCIAL

## 2022-08-16 ENCOUNTER — PATIENT MESSAGE (OUTPATIENT)
Dept: INTEGRATIVE MEDICINE | Facility: CLINIC | Age: 32
End: 2022-08-16

## 2022-08-16 DIAGNOSIS — L70.9 ACNE, UNSPECIFIED ACNE TYPE: Primary | ICD-10-CM

## 2022-08-16 DIAGNOSIS — D22.9 NEVUS: ICD-10-CM

## 2022-08-16 DIAGNOSIS — D22.5 MELANOCYTIC NEVI OF TRUNK: ICD-10-CM

## 2022-08-16 PROCEDURE — 88305 TISSUE EXAM BY PATHOLOGIST: CPT | Performed by: DERMATOLOGY

## 2022-08-16 PROCEDURE — 88341 IMHCHEM/IMCYTCHM EA ADD ANTB: CPT | Performed by: DERMATOLOGY

## 2022-08-16 PROCEDURE — 88342 IMHCHEM/IMCYTCHM 1ST ANTB: CPT | Performed by: DERMATOLOGY

## 2022-08-17 NOTE — TELEPHONE ENCOUNTER
From: Sukhwinder Greco  To: Wade Mariano DO  Sent: 8/16/2022 8:24 PM CDT  Subject: Dermatologist Referral     Dr Rigo Kwon, I met with Dr Axel Pennington today and need a follow up referral for 6 weeks out. I have an appointment September 27th at 4:30pm. I am getting 2 moles removed and discussing acne medication can you please send me a referral for this?      Thanks

## 2022-08-30 ENCOUNTER — PATIENT MESSAGE (OUTPATIENT)
Dept: OTOLARYNGOLOGY | Facility: CLINIC | Age: 32
End: 2022-08-30

## 2022-08-30 NOTE — TELEPHONE ENCOUNTER
From: Radha Green  To: Adrianna Byrd. Darlene Halsted, MD  Sent: 8/30/2022 1:18 PM CDT  Subject: Prescription     Did Brandie contact you about my prescription refill? From our last visit in July you said come back in 6 months which j need to schedule for end of December or early January but I would have refills until the. Please let me know.      Thanks

## 2022-09-02 RX ORDER — PSEUDOEPHEDRINE HCL 120 MG/1
TABLET, FILM COATED, EXTENDED RELEASE ORAL
Qty: 60 TABLET | Refills: 0 | Status: SHIPPED | OUTPATIENT
Start: 2022-09-02

## 2022-09-02 RX ORDER — PSEUDOEPHEDRINE HCL 120 MG/1
120 TABLET, FILM COATED, EXTENDED RELEASE ORAL EVERY 12 HOURS
Qty: 60 TABLET | Refills: 3 | Status: SHIPPED | OUTPATIENT
Start: 2022-09-02

## 2022-09-19 ENCOUNTER — PATIENT MESSAGE (OUTPATIENT)
Dept: OTOLARYNGOLOGY | Facility: CLINIC | Age: 32
End: 2022-09-19

## 2022-09-19 RX ORDER — MONTELUKAST SODIUM 10 MG/1
TABLET ORAL
Qty: 90 TABLET | Refills: 0 | Status: SHIPPED | OUTPATIENT
Start: 2022-09-19

## 2022-09-20 ENCOUNTER — PATIENT MESSAGE (OUTPATIENT)
Dept: OTOLARYNGOLOGY | Facility: CLINIC | Age: 32
End: 2022-09-20

## 2022-09-20 RX ORDER — MONTELUKAST SODIUM 10 MG/1
10 TABLET ORAL NIGHTLY
Qty: 90 TABLET | Refills: 1 | Status: SHIPPED | OUTPATIENT
Start: 2022-09-20

## 2022-09-20 NOTE — TELEPHONE ENCOUNTER
From: Nickolas Sahu  Sent: 9/20/2022 8:32 AM CDT  To: Em Ent Clinical Staff  Subject: Prescription     Correct. Brandie called yesterday and they said the doctor denied this so can you please contact Brandie and approve this? Also, Dr Eddie Salas told me to come back in 6 months so why would I only get a 3 month supply? Thank you.

## 2022-09-20 NOTE — TELEPHONE ENCOUNTER
From: Don Quiñones  Sent: 9/19/2022 5:01 PM CDT  To: Em Ent Clinical Staff  Subject: Prescription     Brandie Just called and said my prescription was denied.  Why is this denied and I should have a six month supply based on my last appointment with Dr. Bridget Carpenter

## 2022-10-17 ENCOUNTER — PATIENT MESSAGE (OUTPATIENT)
Dept: OTOLARYNGOLOGY | Facility: CLINIC | Age: 32
End: 2022-10-17

## 2022-10-17 RX ORDER — AZELASTINE 1 MG/ML
SPRAY, METERED NASAL
Qty: 90 ML | Refills: 1 | Status: SHIPPED | OUTPATIENT
Start: 2022-10-17

## 2022-10-17 NOTE — TELEPHONE ENCOUNTER
Pt calling to speak to RN regarding refills. Pt is frustrated that he is needing to contact office everytime he is needing refill, asking if multiple refills can be added to rx. Also see [employer] messages from 8/30/22.  Please advise

## 2022-10-17 NOTE — TELEPHONE ENCOUNTER
Dr. Nikko Khan, spoke with patient and requesting pseudofed refill. Pended Rx. Also spoke with him and explained refill process. Scheduled FU for 12/6/22.
From: Denise Raymnod  Sent: 10/17/2022 3:55 PM CDT  To: Em Ent Clinical Staff  Subject: Prescription     Shaye- please call me. [phone].  This is very frustrating
Satisfactory

## 2022-10-18 ENCOUNTER — TELEPHONE (OUTPATIENT)
Dept: OTOLARYNGOLOGY | Facility: CLINIC | Age: 32
End: 2022-10-18

## 2022-10-18 RX ORDER — PSEUDOEPHEDRINE HCL 120 MG/1
120 TABLET, FILM COATED, EXTENDED RELEASE ORAL EVERY 12 HOURS
Qty: 60 TABLET | Refills: 5 | Status: SHIPPED | OUTPATIENT
Start: 2022-10-18

## 2022-10-18 NOTE — TELEPHONE ENCOUNTER
CONTROLLED SUBSTANCE SECOND PRESCRIPTION REQUEST FOR     WAL-PHED 12 HOUR CAPLETS 2\"S  TAKE 1 TABLET BY MOUTH EVERY 12 HOURS.   QUANT-60   LAST REFILL 09/13/22

## 2022-11-01 PROCEDURE — 88305 TISSUE EXAM BY PATHOLOGIST: CPT | Performed by: DERMATOLOGY

## 2022-11-02 NOTE — PROGRESS NOTES
The patient had been previously counseled and examined. The procedure of male sterilization was thoroughly discussed with the patient including alternatives, risks, benefits and complications and he understands and wishes to proceed. The patient was brought into the procedure room and placed on the examining table in a supine position. The scrotum was then prepped and then draped in the usual fashion. The right vas deferens was then identified first and approximately 7 cc of 1% Xylocaine was infiltrated overlying the right vas and into the subcutaneous tissues surrounding it. Then when the anesthetic had taken its effect a 1 cm incision was then made through the scrotum overlying the right vas deferens and carried down through the subcutaneous tissues exposing the vas deferens itself. The vas deferens was then carefully dissected from its sheath and a 1 cm segment of the vas deferens was then resected. Hemostats had been placed over each end of the vas deferens. Each end of the vas deferens was lightly cauterized with a hand-held cautery. 2 titanium clips were placed across the proximal end of the vas deferens and the single clip was placed across the distal end. The proximal end of the vas deferens was covered over with the adventitial tissues of the sheath of the vas deferens. Hemostasis was meticulous. The skin edges were then closed using interrupted 4-0 chromic sutures. Then a similar procedure was performed on the left side in the same manner. The specimens were inspected and then discarded. The scrotum was then cleaned with sterile water, bacitracin ointment applied over the incisions, and then sterile dressings. The patient had tolerated the procedure well and was discharged home with appropriate pain medication as needed, the usual postoperative instructions, and advised to obtain a post vasectomy semen sample in 3 or 4 months. I offered follow-up examination as needed.

## 2022-11-03 ENCOUNTER — LAB REQUISITION (OUTPATIENT)
Dept: LAB | Facility: HOSPITAL | Age: 32
End: 2022-11-03
Payer: COMMERCIAL

## 2022-11-03 ENCOUNTER — PROCEDURE (OUTPATIENT)
Dept: SURGERY | Facility: CLINIC | Age: 32
End: 2022-11-03
Payer: COMMERCIAL

## 2022-11-03 DIAGNOSIS — Z30.2 ENCOUNTER FOR STERILIZATION: Primary | ICD-10-CM

## 2022-11-03 DIAGNOSIS — L29.8 OTHER PRURITUS: ICD-10-CM

## 2022-11-03 DIAGNOSIS — D22.61 MELANOCYTIC NEVI OF RIGHT UPPER LIMB, INCLUDING SHOULDER: ICD-10-CM

## 2022-11-03 PROCEDURE — 55250 REMOVAL OF SPERM DUCT(S): CPT | Performed by: UROLOGY

## 2022-11-03 RX ORDER — HYDROCODONE BITARTRATE AND ACETAMINOPHEN 5; 325 MG/1; MG/1
1-2 TABLET ORAL EVERY 4 HOURS PRN
Qty: 20 TABLET | Refills: 0 | Status: SHIPPED | OUTPATIENT
Start: 2022-11-03

## 2022-11-05 ENCOUNTER — HOSPITAL ENCOUNTER (EMERGENCY)
Facility: HOSPITAL | Age: 32
Discharge: HOME | End: 2022-11-05
Attending: EMERGENCY MEDICINE
Payer: COMMERCIAL

## 2022-11-05 VITALS
SYSTOLIC BLOOD PRESSURE: 125 MMHG | RESPIRATION RATE: 18 BRPM | TEMPERATURE: 97 F | HEART RATE: 75 BPM | DIASTOLIC BLOOD PRESSURE: 76 MMHG | OXYGEN SATURATION: 97 %

## 2022-11-05 DIAGNOSIS — T14.8XXA BLEEDING FROM WOUND: Primary | ICD-10-CM

## 2022-11-05 PROCEDURE — 99283 EMERGENCY DEPT VISIT LOW MDM: CPT

## 2022-11-05 PROCEDURE — 30901 CONTROL OF NOSEBLEED: CPT

## 2022-11-05 RX ORDER — TRANEXAMIC ACID 100 MG/ML
5 INJECTION, SOLUTION INTRAVENOUS ONCE
Status: COMPLETED | OUTPATIENT
Start: 2022-11-05 | End: 2022-11-05

## 2022-11-05 RX ORDER — TRANEXAMIC ACID 100 MG/ML
INJECTION, SOLUTION INTRAVENOUS
Status: COMPLETED
Start: 2022-11-05 | End: 2022-11-05

## 2022-11-05 NOTE — ED INITIAL ASSESSMENT (HPI)
Vasectomy on Thursday and noticed some bleeding today. Spoke with Md and was told to come to ER to have a stitch placed.

## 2022-11-07 ENCOUNTER — TELEPHONE (OUTPATIENT)
Dept: OTOLARYNGOLOGY | Facility: CLINIC | Age: 32
End: 2022-11-07

## 2022-11-08 ENCOUNTER — PATIENT MESSAGE (OUTPATIENT)
Dept: OTOLARYNGOLOGY | Facility: CLINIC | Age: 32
End: 2022-11-08

## 2022-11-08 RX ORDER — MONTELUKAST SODIUM 10 MG/1
10 TABLET ORAL NIGHTLY
Qty: 30 TABLET | Refills: 0 | Status: SHIPPED | OUTPATIENT
Start: 2022-11-08

## 2022-11-08 NOTE — TELEPHONE ENCOUNTER
Patient says that he has been taking montelukast twice daily by accident so he only has 3-4 tablets left. He understands it should just be once daily. Asking for 1 month supply sent to pharmacy. Rx sent per protocol.

## 2022-12-06 ENCOUNTER — OFFICE VISIT (OUTPATIENT)
Dept: OTOLARYNGOLOGY | Facility: CLINIC | Age: 32
End: 2022-12-06
Payer: COMMERCIAL

## 2022-12-06 VITALS — DIASTOLIC BLOOD PRESSURE: 80 MMHG | SYSTOLIC BLOOD PRESSURE: 127 MMHG | HEART RATE: 60 BPM

## 2022-12-06 DIAGNOSIS — R09.82 POSTNASAL DISCHARGE: Primary | ICD-10-CM

## 2022-12-06 PROCEDURE — 99213 OFFICE O/P EST LOW 20 MIN: CPT | Performed by: OTOLARYNGOLOGY

## 2022-12-06 PROCEDURE — 3074F SYST BP LT 130 MM HG: CPT | Performed by: OTOLARYNGOLOGY

## 2022-12-06 PROCEDURE — 3079F DIAST BP 80-89 MM HG: CPT | Performed by: OTOLARYNGOLOGY

## 2022-12-06 RX ORDER — AZELASTINE 1 MG/ML
2 SPRAY, METERED NASAL 2 TIMES DAILY
Qty: 3 EACH | Refills: 3 | Status: SHIPPED | OUTPATIENT
Start: 2022-12-06

## 2022-12-06 RX ORDER — PSEUDOEPHEDRINE HCL 120 MG/1
120 TABLET, FILM COATED, EXTENDED RELEASE ORAL EVERY 12 HOURS
Qty: 60 TABLET | Refills: 3 | Status: SHIPPED | OUTPATIENT
Start: 2022-12-06

## 2022-12-06 RX ORDER — MONTELUKAST SODIUM 10 MG/1
10 TABLET ORAL NIGHTLY
Qty: 90 TABLET | Refills: 3 | Status: SHIPPED | OUTPATIENT
Start: 2022-12-06

## 2023-02-07 ENCOUNTER — LAB ENCOUNTER (OUTPATIENT)
Dept: LAB | Facility: HOSPITAL | Age: 33
End: 2023-02-07
Attending: PHYSICIAN ASSISTANT
Payer: COMMERCIAL

## 2023-02-07 ENCOUNTER — TELEPHONE (OUTPATIENT)
Dept: SURGERY | Facility: CLINIC | Age: 33
End: 2023-02-07

## 2023-02-07 DIAGNOSIS — Z98.52 S/P VASECTOMY: ICD-10-CM

## 2023-02-07 DIAGNOSIS — Z98.52 S/P VASECTOMY: Primary | ICD-10-CM

## 2023-02-07 PROCEDURE — 89321 SEMEN ANAL SPERM DETECTION: CPT

## 2023-02-07 NOTE — TELEPHONE ENCOUNTER
Spoke with Carla Benedict. I advised her I will put order in for post vasectomy. I do not see order was placed at time of procedure. Copy of LOV notes from 11/3/22 procedure below. \". Martin Bolden The patient had tolerated the procedure well and was discharged home with appropriate pain medication as needed, the usual postoperative instructions, and advised to obtain a post vasectomy semen sample in 3 or 4 months. I offered follow-up examination as needed. \"

## 2023-05-04 ENCOUNTER — OFFICE VISIT (OUTPATIENT)
Dept: INTEGRATIVE MEDICINE | Facility: CLINIC | Age: 33
End: 2023-05-04
Payer: COMMERCIAL

## 2023-05-04 VITALS
WEIGHT: 211.81 LBS | BODY MASS INDEX: 27 KG/M2 | SYSTOLIC BLOOD PRESSURE: 118 MMHG | HEART RATE: 66 BPM | OXYGEN SATURATION: 96 % | DIASTOLIC BLOOD PRESSURE: 80 MMHG

## 2023-05-04 DIAGNOSIS — M25.512 LEFT SHOULDER PAIN, UNSPECIFIED CHRONICITY: Primary | ICD-10-CM

## 2023-05-04 PROCEDURE — 99214 OFFICE O/P EST MOD 30 MIN: CPT | Performed by: FAMILY MEDICINE

## 2023-05-04 PROCEDURE — 3079F DIAST BP 80-89 MM HG: CPT | Performed by: FAMILY MEDICINE

## 2023-05-04 PROCEDURE — 3074F SYST BP LT 130 MM HG: CPT | Performed by: FAMILY MEDICINE

## 2023-05-04 RX ORDER — CEPHALEXIN 500 MG/1
CAPSULE ORAL
COMMUNITY
Start: 2023-04-11

## 2023-06-29 ENCOUNTER — OFFICE VISIT (OUTPATIENT)
Dept: INTEGRATIVE MEDICINE | Facility: CLINIC | Age: 33
End: 2023-06-29
Payer: COMMERCIAL

## 2023-06-29 VITALS
HEART RATE: 76 BPM | BODY MASS INDEX: 27 KG/M2 | DIASTOLIC BLOOD PRESSURE: 80 MMHG | OXYGEN SATURATION: 96 % | SYSTOLIC BLOOD PRESSURE: 110 MMHG | WEIGHT: 212.63 LBS

## 2023-06-29 DIAGNOSIS — H61.22 IMPACTED CERUMEN OF LEFT EAR: ICD-10-CM

## 2023-06-29 DIAGNOSIS — J30.89 ALLERGIC RHINITIS DUE TO OTHER ALLERGIC TRIGGER, UNSPECIFIED SEASONALITY: ICD-10-CM

## 2023-06-29 DIAGNOSIS — Z00.00 WELL ADULT EXAM: Primary | ICD-10-CM

## 2023-06-29 PROCEDURE — 3074F SYST BP LT 130 MM HG: CPT | Performed by: FAMILY MEDICINE

## 2023-06-29 PROCEDURE — 3079F DIAST BP 80-89 MM HG: CPT | Performed by: FAMILY MEDICINE

## 2023-06-29 PROCEDURE — 69210 REMOVE IMPACTED EAR WAX UNI: CPT | Performed by: FAMILY MEDICINE

## 2023-06-29 PROCEDURE — 99395 PREV VISIT EST AGE 18-39: CPT | Performed by: FAMILY MEDICINE

## 2023-06-29 RX ORDER — TAZAROTENE 1 MG/G
CREAM TOPICAL
COMMUNITY
Start: 2023-06-13

## 2023-06-29 RX ORDER — CIPROFLOXACIN AND DEXAMETHASONE 3; 1 MG/ML; MG/ML
4 SUSPENSION/ DROPS AURICULAR (OTIC) 2 TIMES DAILY
Qty: 7.5 ML | Refills: 0 | Status: SHIPPED | OUTPATIENT
Start: 2023-06-29 | End: 2023-07-06

## 2023-06-29 RX ORDER — DOXYCYCLINE HYCLATE 100 MG/1
100 CAPSULE ORAL 2 TIMES DAILY
COMMUNITY
Start: 2023-06-13

## 2023-07-11 ENCOUNTER — HOSPITAL ENCOUNTER (OUTPATIENT)
Age: 33
Discharge: HOME | End: 2023-07-11
Payer: COMMERCIAL

## 2023-07-11 VITALS
TEMPERATURE: 97 F | SYSTOLIC BLOOD PRESSURE: 109 MMHG | DIASTOLIC BLOOD PRESSURE: 85 MMHG | RESPIRATION RATE: 20 BRPM | OXYGEN SATURATION: 100 % | HEART RATE: 74 BPM

## 2023-07-11 DIAGNOSIS — M54.40 BACK PAIN OF LUMBAR REGION WITH SCIATICA: Primary | ICD-10-CM

## 2023-07-11 PROCEDURE — 99213 OFFICE O/P EST LOW 20 MIN: CPT | Performed by: NURSE PRACTITIONER

## 2023-07-11 RX ORDER — CYCLOBENZAPRINE HCL 10 MG
10 TABLET ORAL 3 TIMES DAILY PRN
Qty: 20 TABLET | Refills: 0 | Status: SHIPPED | OUTPATIENT
Start: 2023-07-11 | End: 2023-07-18

## 2023-07-11 RX ORDER — PREDNISONE 20 MG/1
60 TABLET ORAL DAILY
Qty: 21 TABLET | Refills: 0 | Status: SHIPPED | OUTPATIENT
Start: 2023-07-11 | End: 2023-07-18

## 2023-07-11 NOTE — ED INITIAL ASSESSMENT (HPI)
Pt c/o lower back pain x 4 days. Denies injury, does states he has been doing outdoor activities in the last 1 week.

## 2023-07-27 ENCOUNTER — LAB ENCOUNTER (OUTPATIENT)
Dept: LAB | Facility: REFERENCE LAB | Age: 33
End: 2023-07-27
Attending: FAMILY MEDICINE
Payer: COMMERCIAL

## 2023-07-27 DIAGNOSIS — Z00.00 WELL ADULT EXAM: ICD-10-CM

## 2023-07-27 DIAGNOSIS — J30.89 ALLERGIC RHINITIS DUE TO OTHER ALLERGIC TRIGGER, UNSPECIFIED SEASONALITY: ICD-10-CM

## 2023-07-27 LAB
ALBUMIN SERPL-MCNC: 3.6 G/DL (ref 3.4–5)
ALBUMIN/GLOB SERPL: 1.1 {RATIO} (ref 1–2)
ALP LIVER SERPL-CCNC: 55 U/L
ALT SERPL-CCNC: 64 U/L
ANION GAP SERPL CALC-SCNC: 4 MMOL/L (ref 0–18)
AST SERPL-CCNC: 31 U/L (ref 15–37)
BASOPHILS # BLD AUTO: 0.04 X10(3) UL (ref 0–0.2)
BASOPHILS NFR BLD AUTO: 0.8 %
BILIRUB SERPL-MCNC: 0.8 MG/DL (ref 0.1–2)
BUN BLD-MCNC: 13 MG/DL (ref 7–18)
BUN/CREAT SERPL: 10.3 (ref 10–20)
CALCIUM BLD-MCNC: 9.5 MG/DL (ref 8.5–10.1)
CHLORIDE SERPL-SCNC: 106 MMOL/L (ref 98–112)
CHOLEST SERPL-MCNC: 247 MG/DL (ref ?–200)
CO2 SERPL-SCNC: 29 MMOL/L (ref 21–32)
CREAT BLD-MCNC: 1.26 MG/DL
CRP SERPL HS-MCNC: 0.4 MG/L (ref ?–3)
DEPRECATED RDW RBC AUTO: 41.1 FL (ref 35.1–46.3)
EGFRCR SERPLBLD CKD-EPI 2021: 76 ML/MIN/1.73M2 (ref 60–?)
EOSINOPHIL # BLD AUTO: 0.04 X10(3) UL (ref 0–0.7)
EOSINOPHIL NFR BLD AUTO: 0.8 %
ERYTHROCYTE [DISTWIDTH] IN BLOOD BY AUTOMATED COUNT: 12.4 % (ref 11–15)
EST. AVERAGE GLUCOSE BLD GHB EST-MCNC: 117 MG/DL (ref 68–126)
FASTING PATIENT LIPID ANSWER: YES
FASTING STATUS PATIENT QL REPORTED: YES
GLOBULIN PLAS-MCNC: 3.3 G/DL (ref 2.8–4.4)
GLUCOSE BLD-MCNC: 87 MG/DL (ref 70–99)
HBA1C MFR BLD: 5.7 % (ref ?–5.7)
HCT VFR BLD AUTO: 47.9 %
HDLC SERPL-MCNC: 48 MG/DL (ref 40–59)
HGB BLD-MCNC: 16.3 G/DL
IMM GRANULOCYTES # BLD AUTO: 0.01 X10(3) UL (ref 0–1)
IMM GRANULOCYTES NFR BLD: 0.2 %
LDLC SERPL CALC-MCNC: 165 MG/DL (ref ?–100)
LYMPHOCYTES # BLD AUTO: 1.72 X10(3) UL (ref 1–4)
LYMPHOCYTES NFR BLD AUTO: 33.8 %
MCH RBC QN AUTO: 30.9 PG (ref 26–34)
MCHC RBC AUTO-ENTMCNC: 34 G/DL (ref 31–37)
MCV RBC AUTO: 90.9 FL
MONOCYTES # BLD AUTO: 0.46 X10(3) UL (ref 0.1–1)
MONOCYTES NFR BLD AUTO: 9 %
NEUTROPHILS # BLD AUTO: 2.82 X10 (3) UL (ref 1.5–7.7)
NEUTROPHILS # BLD AUTO: 2.82 X10(3) UL (ref 1.5–7.7)
NEUTROPHILS NFR BLD AUTO: 55.4 %
NONHDLC SERPL-MCNC: 199 MG/DL (ref ?–130)
OSMOLALITY SERPL CALC.SUM OF ELEC: 287 MOSM/KG (ref 275–295)
PLATELET # BLD AUTO: 122 10(3)UL (ref 150–450)
POTASSIUM SERPL-SCNC: 4.5 MMOL/L (ref 3.5–5.1)
PROT SERPL-MCNC: 6.9 G/DL (ref 6.4–8.2)
RBC # BLD AUTO: 5.27 X10(6)UL
SODIUM SERPL-SCNC: 139 MMOL/L (ref 136–145)
TRIGL SERPL-MCNC: 186 MG/DL (ref 30–149)
TSI SER-ACNC: 1.72 MIU/ML (ref 0.36–3.74)
VIT D+METAB SERPL-MCNC: 59.5 NG/ML (ref 30–100)
VLDLC SERPL CALC-MCNC: 37 MG/DL (ref 0–30)
WBC # BLD AUTO: 5.1 X10(3) UL (ref 4–11)

## 2023-07-27 PROCEDURE — 83036 HEMOGLOBIN GLYCOSYLATED A1C: CPT

## 2023-07-27 PROCEDURE — 36415 COLL VENOUS BLD VENIPUNCTURE: CPT

## 2023-07-27 PROCEDURE — 86141 C-REACTIVE PROTEIN HS: CPT

## 2023-07-27 PROCEDURE — 80061 LIPID PANEL: CPT

## 2023-07-27 PROCEDURE — 80053 COMPREHEN METABOLIC PANEL: CPT

## 2023-07-27 PROCEDURE — 84443 ASSAY THYROID STIM HORMONE: CPT

## 2023-07-27 PROCEDURE — 85025 COMPLETE CBC W/AUTO DIFF WBC: CPT

## 2023-07-27 PROCEDURE — 82306 VITAMIN D 25 HYDROXY: CPT

## 2023-08-02 ENCOUNTER — TELEMEDICINE (OUTPATIENT)
Dept: INTEGRATIVE MEDICINE | Facility: CLINIC | Age: 33
End: 2023-08-02
Payer: COMMERCIAL

## 2023-08-02 DIAGNOSIS — M70.61 TROCHANTERIC BURSITIS OF RIGHT HIP: ICD-10-CM

## 2023-08-02 DIAGNOSIS — R73.01 ELEVATED FASTING BLOOD SUGAR: ICD-10-CM

## 2023-08-02 DIAGNOSIS — L70.9 ACNE, UNSPECIFIED ACNE TYPE: ICD-10-CM

## 2023-08-02 DIAGNOSIS — R74.8 ELEVATED LIVER ENZYMES: ICD-10-CM

## 2023-08-02 DIAGNOSIS — E78.5 HYPERLIPIDEMIA, UNSPECIFIED HYPERLIPIDEMIA TYPE: Primary | ICD-10-CM

## 2023-08-02 PROCEDURE — 99214 OFFICE O/P EST MOD 30 MIN: CPT | Performed by: FAMILY MEDICINE

## 2023-08-02 NOTE — PROGRESS NOTES
Sebastian Ortez is a 26year old male. Patient presents with: Follow - Up: Labs       HPI:     26 male present to discuss result labs testing. Increase is cholesterol noted from about 1 year ago. Reports diet increased travel, poor diet and ETOH consumption. Notes also straining back and has been unable to exercise. Believes changes maybe related to lifestyle. Has recent increased exercise with indoor biking, stretching, and light resistance training. Low back strain has improved with physical therapy. Notes some radiation down into the right hip. Denies weakness, numbness or tingling. Worse in the morning but better movement. Denies loss of bowel or bladder control. Notes lower back pain is overall improved at this time. Was using muscle relaxers for back pain. Complains of ongoing acne over the last 10 years. Notes acne can be both small red bumps to cystic lesions. Currently on doxycycline per dermatology. Overall acne has improved at this time. Would like to explore further causes at this time. Total Cholesterol 247, Trig 186,                 REVIEW OF SYSTEMS:   Review of Systems   Musculoskeletal:  Positive for joint pain. Skin:         acne   All other systems reviewed and are negative. FAMILY HISTORY:      Family History   Problem Relation Age of Onset    Anemia Father     Anemia Mother        MEDICAL HISTORY:   History reviewed. No pertinent past medical history. CURRENT MEDICATIONS:     Current Outpatient Medications   Medication Sig Dispense Refill    doxycycline 100 MG Oral Cap Take 1 capsule (100 mg total) by mouth 2 (two) times daily. Tazarotene 0.1 % External Cream       cephalexin 500 MG Oral Cap       azelastine 0.1 % Nasal Solution 2 sprays by Nasal route 2 (two) times daily. 3 each 3    pseudoephedrine  MG Oral Tablet 12 Hr Take 1 tablet (120 mg total) by mouth every 12 (twelve) hours.  60 tablet 3    montelukast 10 MG Oral Tab Take 1 tablet (10 mg total) by mouth nightly. 30 tablet 0    HYDROcodone-acetaminophen 5-325 MG Oral Tab Take 1-2 tablets by mouth every 4 (four) hours as needed for Pain. 20 tablet 0    pseudoephedrine  MG Oral Tablet 12 Hr Take 1 tablet (120 mg total) by mouth Q12H. 60 tablet 5    AZELASTINE 0.1 % Nasal Solution USE 2 SPRAYS IN EACH NOSTRIL TWICE DAILY 90 mL 1    pseudoephedrine  MG Oral Tablet 12 Hr Take 1 tablet (120 mg total) by mouth every 12 (twelve) hours. 60 tablet 3    clindamycin 1 % External Lotion       fluticasone propionate 50 MCG/ACT Nasal Suspension 1 spray by Nasal route daily. Clindamycin Phosphate 1 % External Gel          SOCIAL HISTORY:   Social History    Socioeconomic History      Marital status:       Spouse name: Not on file      Number of children: Not on file      Years of education: Not on file      Highest education level: Not on file    Occupational History      Not on file    Tobacco Use      Smoking status: Never      Smokeless tobacco: Never    Vaping Use      Vaping Use: Never used    Substance and Sexual Activity      Alcohol use: Yes        Comment: social      Drug use: Never      Sexual activity: Not on file    Other Topics      Concerns:        Caffeine Concern: Not Asked        Exercise: Not Asked        Seat Belt: Not Asked        Special Diet: No        Stress Concern: Not Asked        Weight Concern: Not Asked    Social History Narrative      Not on file    Social Determinants of Health  Financial Resource Strain: Not on file  Food Insecurity: Not on file  Transportation Needs: Not on file  Physical Activity: Not on file  Stress: Not on file  Social Connections: Not on file  Housing Stability: Not on file    SURGICAL HISTORY:   History reviewed. No pertinent surgical history. PHYSICAL EXAM:   There were no vitals filed for this visit.     Physical Exam    ASSESSMENT AND PLAN:     [facility] hospital Lab Encounter on 07/27/2023   Component Date Value Ref Range Status Cholesterol, Total 07/27/2023 247 (H)  <200 mg/dL Final    Desirable  <200 mg/dL  Borderline  200-239 mg/dL  High      >=240 mg/dL        HDL Cholesterol 07/27/2023 48  40 - 59 mg/dL Final    Interpretive Information:   An HDL cholesterol <40 mg/dL is low and constitutes a coronary heart disease risk factor. An HDL cholesterol >60 mg/dL is a negative risk factor for coronary heart disease.         Triglycerides 07/27/2023 186 (H)  30 - 149 mg/dL Final    Reference interval for fasting triglycerides  Desirable: <150 mg/dL  Borderline: 150-199 mg/dL  High: 200-499 mg/dL  Very High: >=500 mg/dL          LDL Cholesterol 07/27/2023 165 (H)  <100 mg/dL Final    Desirable <100 mg/dL   Borderline 100-129 mg/dL   High     >=130mg/dL        VLDL 07/27/2023 37 (H)  0 - 30 mg/dL Final    Non HDL Chol 07/27/2023 199 (H)  <130 mg/dL Final    Desirable  <130 mg/dL   Borderline  130-159 mg/dL   High        160-189 mg/dL       Very high >=190 mg/dL        Patient Fasting for Lipid? 07/27/2023 Yes   Final    Glucose 07/27/2023 87  70 - 99 mg/dL Final    Sodium 07/27/2023 139  136 - 145 mmol/L Final    Potassium 07/27/2023 4.5  3.5 - 5.1 mmol/L Final    Chloride 07/27/2023 106  98 - 112 mmol/L Final    CO2 07/27/2023 29.0  21.0 - 32.0 mmol/L Final    Anion Gap 07/27/2023 4  0 - 18 mmol/L Final    BUN 07/27/2023 13  7 - 18 mg/dL Final    Creatinine 07/27/2023 1.26  0.70 - 1.30 mg/dL Final    BUN/CREA Ratio 07/27/2023 10.3  10.0 - 20.0 Final    Calcium, Total 07/27/2023 9.5  8.5 - 10.1 mg/dL Final    Calculated Osmolality 07/27/2023 287  275 - 295 mOsm/kg Final    eGFR-Cr 07/27/2023 76  >=60 mL/min/1.73m2 Final    ALT 07/27/2023 64 (H)  16 - 61 U/L Final    AST 07/27/2023 31  15 - 37 U/L Final    Alkaline Phosphatase 07/27/2023 55  45 - 117 U/L Final    Bilirubin, Total 07/27/2023 0.8  0.1 - 2.0 mg/dL Final    Total Protein 07/27/2023 6.9  6.4 - 8.2 g/dL Final    Albumin 07/27/2023 3.6  3.4 - 5.0 g/dL Final    Globulin  07/27/2023 3.3 2.8 - 4.4 g/dL Final    A/G Ratio 07/27/2023 1.1  1.0 - 2.0 Final    Patient Fasting for CMP? 07/27/2023 Yes   Final    TSH 07/27/2023 1.720  0.358 - 3.740 mIU/mL Final    This test may exhibit interference when a sample is collected from a person who is consuming high dose of biotin (a.k.a., vitamin B7, vitamin H, coenzyme R) supplements resulting in serum concentrations >100 ng/mL. Intake of the recommended daily allowance (RDA) for biotin (0.03 mg) has not been shown to typically cause significant interference; however, high dose daily dietary supplements may contain biotin concentrations greater than 150 times (5-10 mg) the RDA. It is recommended that physicians ask all patients who may be on biotin supplementation to stop biotin consumption at least 72 hours prior to collection of a new sample. hsCRP 07/27/2023 0.40  <3.00 mg/L Final    Risk Categories     Low Risk      <1.0 mg/L     Average Risk  1.0-3.0 mg/L     High Risk     >3.0 mg/L        HgbA1C 07/27/2023 5.7 (H)  <5.7 % Final     Normal HbA1C:     <5.7%      Pre-Diabetic:     5.7 - 6.4%      Diabetic:         >6.4%      Diabetic Control: <7.0%        Estimated Average Glucose 07/27/2023 117  68 - 126 mg/dL Final    eAG is the estimated average glucose calculated from Hgb A1c according to the formula recommended by the American Diabetes Association. eAG levels reflect the long term average glucose and may not correlate with random or fasting glucose levels since these represent specific points in time.            WBC 07/27/2023 5.1  4.0 - 11.0 x10(3) uL Final    RBC 07/27/2023 5.27  4.30 - 5.70 x10(6)uL Final    HGB 07/27/2023 16.3  13.0 - 17.5 g/dL Final    HCT 07/27/2023 47.9  39.0 - 53.0 % Final    MCV 07/27/2023 90.9  80.0 - 100.0 fL Final    MCH 07/27/2023 30.9  26.0 - 34.0 pg Final    MCHC 07/27/2023 34.0  31.0 - 37.0 g/dL Final    RDW-SD 07/27/2023 41.1  35.1 - 46.3 fL Final    RDW 07/27/2023 12.4  11.0 - 15.0 % Final    PLT 07/27/2023 122.0 (L)  150.0 - 450.0 10(3)uL Final    Neutrophil Absolute Prelim 07/27/2023 2.82  1.50 - 7.70 x10 (3) uL Final    Neutrophil Absolute 07/27/2023 2.82  1.50 - 7.70 x10(3) uL Final    Lymphocyte Absolute 07/27/2023 1.72  1.00 - 4.00 x10(3) uL Final    Monocyte Absolute 07/27/2023 0.46  0.10 - 1.00 x10(3) uL Final    Eosinophil Absolute 07/27/2023 0.04  0.00 - 0.70 x10(3) uL Final    Basophil Absolute 07/27/2023 0.04  0.00 - 0.20 x10(3) uL Final    Immature Granulocyte Absolute 07/27/2023 0.01  0.00 - 1.00 x10(3) uL Final    Neutrophil % 07/27/2023 55.4  % Final    Lymphocyte % 07/27/2023 33.8  % Final    Monocyte % 07/27/2023 9.0  % Final    Eosinophil % 07/27/2023 0.8  % Final    Basophil % 07/27/2023 0.8  % Final    Immature Granulocyte % 07/27/2023 0.2  % Final    Vitamin D, 25OH, Total 07/27/2023 59.5  30.0 - 100.0 ng/mL Final    Literature Recommendations for 25(OH)D levels are:  Range           Vitamin D Status   <20    ng/mL      Deficiency   20-<30 ng/mL      Insufficiency    ng/mL      Sufficiency   >100   ng/mL      Toxicity    *Clinical controversy exists regarding optimal 25(OH)D levels. Emerging evidence links potential adverse effects to high levels, particularly >60 ng/mL. [facility] Lab Encounter on 02/07/2023   Component Date Value Ref Range Status    Non-concentrated sperm estimate 02/07/2023 No sperm seen  No sperm seen /HPF Final      No results found. There are no diagnoses linked to this encounter. Derm: Acne. Currently using doxycycline. Has history of cystic acne with Accutane use in the past.  We will provide further work-up with food sensitivity testing and support GI health. Food sensitivity IgG testing  Labs: See lab orders for details  Recommend probiotic and initial nutrient support for liver detox    Cardiometabolic: Recent history of elevated cholesterol and mild elevation of hemoglobin A1c. Poor dietary and lifestyle habits over the last month.   Patient will increase exercise and improve diet at this time. Further nutrient support discussed. Will provide 3-month lab follow-up. Labs: See labs for details  See patient instructions for nutrient supplements support  Education: Bailey diet  Diet: Low glycemic index diet    Muscle skeletal: Right hip pain most likely trochanteric bursitis given location and description. Unable to fully evaluate on telemedicine visit. Will initiate home exercise program.    Home exercise program for trochanteric bursitis link provided    Follow-up in 3 months      This visit was conducted using Telemedicine with live, interactive video and audio. The patient understands the risks and benefits of Telemedicine and that a Telemedicine visit limits the ability to perform a thorough physical examination which may affect objective findings related to specific symptoms and conditions which can, in turn, affect treatment. The patient was located in the state of [place] at the time of the encounter. Time spent with patient: 32 minutes in direct communication explaining the rationale for treatment, creating unique care plan in EPIC, reviewing SE and overall treatment plan. Over 50% of this time was in education, counseling and coordination of care. Given further recommendations as below    Orders Placed This Visit:  No orders of the defined types were placed in this encounter. No orders of the defined types were placed in this encounter. There are no Patient Instructions on file for this visit. No follow-ups on file. Patient affirmed understanding of plan and all questions were answered.      Toni Trinidad [address] [place] [address]

## 2023-10-17 ENCOUNTER — LAB ENCOUNTER (OUTPATIENT)
Dept: LAB | Facility: REFERENCE LAB | Age: 33
End: 2023-10-17
Attending: FAMILY MEDICINE
Payer: COMMERCIAL

## 2023-10-17 DIAGNOSIS — E78.5 HYPERLIPIDEMIA, UNSPECIFIED HYPERLIPIDEMIA TYPE: ICD-10-CM

## 2023-10-17 DIAGNOSIS — R73.01 ELEVATED FASTING BLOOD SUGAR: ICD-10-CM

## 2023-10-17 DIAGNOSIS — M70.61 TROCHANTERIC BURSITIS OF RIGHT HIP: ICD-10-CM

## 2023-10-17 DIAGNOSIS — R74.8 ELEVATED LIVER ENZYMES: ICD-10-CM

## 2023-10-17 DIAGNOSIS — L70.9 ACNE, UNSPECIFIED ACNE TYPE: ICD-10-CM

## 2023-10-17 LAB
ALBUMIN SERPL-MCNC: 4.3 G/DL (ref 3.4–5)
ALBUMIN/GLOB SERPL: 1.3 {RATIO} (ref 1–2)
ALP LIVER SERPL-CCNC: 66 U/L
ALT SERPL-CCNC: 64 U/L
ANION GAP SERPL CALC-SCNC: 5 MMOL/L (ref 0–18)
AST SERPL-CCNC: 35 U/L (ref 15–37)
BASOPHILS # BLD AUTO: 0.03 X10(3) UL (ref 0–0.2)
BASOPHILS NFR BLD AUTO: 0.6 %
BILIRUB SERPL-MCNC: 0.7 MG/DL (ref 0.1–2)
BUN BLD-MCNC: 17 MG/DL (ref 7–18)
BUN/CREAT SERPL: 12.7 (ref 10–20)
CALCIUM BLD-MCNC: 9.8 MG/DL (ref 8.5–10.1)
CHLORIDE SERPL-SCNC: 109 MMOL/L (ref 98–112)
CHOLEST SERPL-MCNC: 217 MG/DL (ref ?–200)
CO2 SERPL-SCNC: 27 MMOL/L (ref 21–32)
CREAT BLD-MCNC: 1.34 MG/DL
DEPRECATED RDW RBC AUTO: 39.7 FL (ref 35.1–46.3)
DHEA-S SERPL-MCNC: 148.8 UG/DL
EGFRCR SERPLBLD CKD-EPI 2021: 71 ML/MIN/1.73M2 (ref 60–?)
EOSINOPHIL # BLD AUTO: 0.03 X10(3) UL (ref 0–0.7)
EOSINOPHIL NFR BLD AUTO: 0.6 %
ERYTHROCYTE [DISTWIDTH] IN BLOOD BY AUTOMATED COUNT: 12 % (ref 11–15)
FASTING PATIENT LIPID ANSWER: YES
FASTING STATUS PATIENT QL REPORTED: YES
GLOBULIN PLAS-MCNC: 3.4 G/DL (ref 2.8–4.4)
GLUCOSE BLD-MCNC: 103 MG/DL (ref 70–99)
HCT VFR BLD AUTO: 49.1 %
HDLC SERPL-MCNC: 46 MG/DL (ref 40–59)
HGB BLD-MCNC: 16.5 G/DL
IMM GRANULOCYTES # BLD AUTO: 0.01 X10(3) UL (ref 0–1)
IMM GRANULOCYTES NFR BLD: 0.2 %
LDLC SERPL CALC-MCNC: 147 MG/DL (ref ?–100)
LYMPHOCYTES # BLD AUTO: 1.55 X10(3) UL (ref 1–4)
LYMPHOCYTES NFR BLD AUTO: 28.6 %
MCH RBC QN AUTO: 30.4 PG (ref 26–34)
MCHC RBC AUTO-ENTMCNC: 33.6 G/DL (ref 31–37)
MCV RBC AUTO: 90.6 FL
MONOCYTES # BLD AUTO: 0.55 X10(3) UL (ref 0.1–1)
MONOCYTES NFR BLD AUTO: 10.1 %
NEUTROPHILS # BLD AUTO: 3.25 X10 (3) UL (ref 1.5–7.7)
NEUTROPHILS # BLD AUTO: 3.25 X10(3) UL (ref 1.5–7.7)
NEUTROPHILS NFR BLD AUTO: 59.9 %
NONHDLC SERPL-MCNC: 171 MG/DL (ref ?–130)
OSMOLALITY SERPL CALC.SUM OF ELEC: 294 MOSM/KG (ref 275–295)
PLATELET # BLD AUTO: 148 10(3)UL (ref 150–450)
POTASSIUM SERPL-SCNC: 4.4 MMOL/L (ref 3.5–5.1)
PROT SERPL-MCNC: 7.7 G/DL (ref 6.4–8.2)
RBC # BLD AUTO: 5.42 X10(6)UL
SODIUM SERPL-SCNC: 141 MMOL/L (ref 136–145)
TRIGL SERPL-MCNC: 135 MG/DL (ref 30–149)
VLDLC SERPL CALC-MCNC: 25 MG/DL (ref 0–30)
WBC # BLD AUTO: 5.4 X10(3) UL (ref 4–11)

## 2023-10-17 PROCEDURE — 84410 TESTOSTERONE BIOAVAILABLE: CPT

## 2023-10-17 PROCEDURE — 83036 HEMOGLOBIN GLYCOSYLATED A1C: CPT

## 2023-10-17 PROCEDURE — 80061 LIPID PANEL: CPT

## 2023-10-17 PROCEDURE — 80053 COMPREHEN METABOLIC PANEL: CPT

## 2023-10-17 PROCEDURE — 36415 COLL VENOUS BLD VENIPUNCTURE: CPT

## 2023-10-17 PROCEDURE — 85025 COMPLETE CBC W/AUTO DIFF WBC: CPT

## 2023-10-17 PROCEDURE — 82627 DEHYDROEPIANDROSTERONE: CPT

## 2023-10-18 LAB
EST. AVERAGE GLUCOSE BLD GHB EST-MCNC: 111 MG/DL (ref 68–126)
HBA1C MFR BLD: 5.5 % (ref ?–5.7)

## 2023-10-25 LAB
SEX HORM BIND GLOB: 45.8 NMOL/L
TESTOST % FREE+WEAK BND: 11.6 %
TESTOST FREE+WEAK BND: 101.4 NG/DL
TESTOSTERONE TOT /MS: 873.8 NG/DL

## 2023-11-01 ENCOUNTER — OFFICE VISIT (OUTPATIENT)
Dept: INTEGRATIVE MEDICINE | Facility: CLINIC | Age: 33
End: 2023-11-01
Payer: COMMERCIAL

## 2023-11-01 VITALS
BODY MASS INDEX: 27.48 KG/M2 | SYSTOLIC BLOOD PRESSURE: 126 MMHG | HEART RATE: 77 BPM | OXYGEN SATURATION: 97 % | WEIGHT: 216.38 LBS | HEIGHT: 74.5 IN | DIASTOLIC BLOOD PRESSURE: 68 MMHG

## 2023-11-01 DIAGNOSIS — R73.01 ELEVATED FASTING BLOOD SUGAR: ICD-10-CM

## 2023-11-01 DIAGNOSIS — L70.8 OTHER ACNE: ICD-10-CM

## 2023-11-01 DIAGNOSIS — E78.5 HYPERLIPIDEMIA, UNSPECIFIED HYPERLIPIDEMIA TYPE: Primary | ICD-10-CM

## 2023-11-01 PROCEDURE — 3074F SYST BP LT 130 MM HG: CPT | Performed by: PHYSICIAN ASSISTANT

## 2023-11-01 PROCEDURE — 3078F DIAST BP <80 MM HG: CPT | Performed by: PHYSICIAN ASSISTANT

## 2023-11-01 PROCEDURE — 99214 OFFICE O/P EST MOD 30 MIN: CPT | Performed by: PHYSICIAN ASSISTANT

## 2023-11-01 PROCEDURE — 3008F BODY MASS INDEX DOCD: CPT | Performed by: PHYSICIAN ASSISTANT

## 2023-11-01 NOTE — PROGRESS NOTES
Gilford Roes is a 26year old male. Patient presents with:  Establish Care: Review lab results       HPI:   25-year-old male patient of Dr. Michelle Hutchison presents today for lab results. Hyperlipidemia on July labs and returns today after 3 months of eating better, exercising more, and reducing alcohol intake. Did not start on MitoCore, liver defend, and sun fiber. Started [employer] in last 3weeks with wife that includes genetic testing with nutrient specific recommendations. Does feel better. Acne - started an IV (zinc, glutathione, ALA, biotin) in [place] and off the doxcycline. Feels it may have helped somewhat. REVIEW OF SYSTEMS:   Review of Systems   Constitutional: Negative. Negative for chills, fatigue and fever. Eyes: Negative. Negative for visual disturbance. Respiratory: Negative. Negative for cough, shortness of breath and wheezing. Cardiovascular: Negative. Negative for chest pain. Gastrointestinal: Negative. Endocrine: Negative. Genitourinary: Negative. Musculoskeletal: Negative. Skin: Negative. Cystic acne   Allergic/Immunologic: Negative. Neurological: Negative. Negative for weakness and headaches. Hematological: Negative. Psychiatric/Behavioral: Negative. FAMILY HISTORY:      Family History   Problem Relation Age of Onset    Anemia Father     Anemia Mother        MEDICAL HISTORY:   History reviewed. No pertinent past medical history. CURRENT MEDICATIONS:     Current Outpatient Medications   Medication Sig Dispense Refill    Tazarotene 0.1 % External Cream       montelukast 10 MG Oral Tab Take 1 tablet (10 mg total) by mouth nightly. 30 tablet 0    HYDROcodone-acetaminophen 5-325 MG Oral Tab Take 1-2 tablets by mouth every 4 (four) hours as needed for Pain.  20 tablet 0    AZELASTINE 0.1 % Nasal Solution USE 2 SPRAYS IN EACH NOSTRIL TWICE DAILY 90 mL 1    clindamycin 1 % External Lotion       fluticasone propionate 50 MCG/ACT Nasal Suspension 1 spray by Nasal route daily. doxycycline 100 MG Oral Cap Take 1 capsule (100 mg total) by mouth 2 (two) times daily. (Patient not taking: Reported on 11/1/2023)      cephalexin 500 MG Oral Cap  (Patient not taking: Reported on 11/1/2023)      azelastine 0.1 % Nasal Solution 2 sprays by Nasal route 2 (two) times daily. (Patient not taking: Reported on 11/1/2023) 3 each 3    pseudoephedrine  MG Oral Tablet 12 Hr Take 1 tablet (120 mg total) by mouth every 12 (twelve) hours. (Patient not taking: Reported on 11/1/2023) 60 tablet 3    pseudoephedrine  MG Oral Tablet 12 Hr Take 1 tablet (120 mg total) by mouth Q12H. (Patient not taking: Reported on 11/1/2023) 60 tablet 5    pseudoephedrine  MG Oral Tablet 12 Hr Take 1 tablet (120 mg total) by mouth every 12 (twelve) hours. 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 No pertinent surgical history. PHYSICAL EXAM:      11/01/23  1139   BP: 126/68   BP Location: Right arm   Patient Position: Sitting   Cuff Size: adult   Pulse: 77   SpO2: 97%   Weight: 216 lb 6.4 oz (98.2 kg)   Height: 6' 2.5\" (1.892 m)       Physical Exam  Vitals reviewed. Constitutional:       General: He is not in acute distress. Appearance: Normal appearance.    HENT:      Mouth/Throat:      Mouth: Mucous membranes are moist.      Pharynx: Oropharynx is clear. Eyes:      Extraocular Movements: Extraocular movements intact. Conjunctiva/sclera: Conjunctivae normal.   Cardiovascular:      Rate and Rhythm: Normal rate and regular rhythm. Heart sounds: Normal heart sounds. No murmur heard. Pulmonary:      Effort: Pulmonary effort is normal.      Breath sounds: Normal breath sounds. Musculoskeletal:         General: No swelling or deformity. Skin:     General: Skin is warm and dry. Neurological:      General: No focal deficit present. Mental Status: He is alert and oriented to person, place, and time. Psychiatric:         Mood and Affect: Mood normal.         Behavior: Behavior normal.         Thought Content: Thought content normal.         Judgment: Judgment normal.          ASSESSMENT AND PLAN:     [facility] Lab Encounter on 10/17/2023   Component Date Value Ref Range Status    Testosterone Tot LC/MS 10/17/2023 873.8  264.0 - 916.0 ng/dL Final    This [facility] LC/MS-MS method is currently certified by the [facility]  Hormone Standardization Program (HoSt). Adult male reference  interval is based on a population of healthy nonobese males  (BMI <30) between 23and 44years old. [address] [place] [address], [address] [place] [address]  [phone]. PMID: 77346805. Testost % Free+Weak Bnd 10/17/2023 11.6  9.0 - 46.0 % Final    This test was developed and its performance characteristics  determined by [facility]. It has not been cleared or approved  by the Food and Drug Administration.     Testost Free+Weak Bnd 10/17/2023 101.4  40.0 - 250.0 ng/dL Final    Sex Horm Bind Glob 10/17/2023 45.8  16.5 - 55.9 nmol/L Final    DHEA Sulfate 10/17/2023 148.8  34.5 - 568.9 ug/dL Final    This test may exhibit interference of greater than 10% when a sample is collected from a person who is consuming high dose of biotin (a.k.a., vitamin B7, vitamin H, coenzyme R) supplements resulting in serum concentrations &gt;12.5 ng/mL, leading to either falsely elevated or falsely depressed results. Intake of the recommended daily allowance (RDA) for biotin (0.03 mg) has not been shown to typically cause significant interference; however, high dose daily dietary supplements may contain biotin concentrations greater than 150 times (5-10 mg) the RDA. It is recommended that physicians ask all patients who may be on biotin supplementation to stop biotin consumption at least 72 hours prior to collection of a new sample. Glucose 10/17/2023 103 (H)  70 - 99 mg/dL Final    Sodium 10/17/2023 141  136 - 145 mmol/L Final    Potassium 10/17/2023 4.4  3.5 - 5.1 mmol/L Final    Chloride 10/17/2023 109  98 - 112 mmol/L Final    CO2 10/17/2023 27.0  21.0 - 32.0 mmol/L Final    Anion Gap 10/17/2023 5  0 - 18 mmol/L Final    BUN 10/17/2023 17  7 - 18 mg/dL Final    Creatinine 10/17/2023 1.34 (H)  0.70 - 1.30 mg/dL Final    BUN/CREA Ratio 10/17/2023 12.7  10.0 - 20.0 Final    Calcium, Total 10/17/2023 9.8  8.5 - 10.1 mg/dL Final    Calculated Osmolality 10/17/2023 294  275 - 295 mOsm/kg Final    eGFR-Cr 10/17/2023 71  >=60 mL/min/1.73m2 Final    ALT 10/17/2023 64 (H)  16 - 61 U/L Final    AST 10/17/2023 35  15 - 37 U/L Final    Alkaline Phosphatase 10/17/2023 66  45 - 117 U/L Final    Bilirubin, Total 10/17/2023 0.7  0.1 - 2.0 mg/dL Final    Total Protein 10/17/2023 7.7  6.4 - 8.2 g/dL Final    Albumin 10/17/2023 4.3  3.4 - 5.0 g/dL Final    Globulin  10/17/2023 3.4  2.8 - 4.4 g/dL Final    A/G Ratio 10/17/2023 1.3  1.0 - 2.0 Final    Patient Fasting for CMP? 10/17/2023 Yes   Final    Cholesterol, Total 10/17/2023 217 (H)  <200 mg/dL Final    Desirable  <200 mg/dL  Borderline  200-239 mg/dL  High      >=240 mg/dL        HDL Cholesterol 10/17/2023 46  40 - 59 mg/dL Final    Interpretive Information:   An HDL cholesterol <40 mg/dL is low and constitutes a coronary heart disease risk factor. An HDL cholesterol >60 mg/dL is a negative risk factor for coronary heart disease. Triglycerides 10/17/2023 135  30 - 149 mg/dL Final    Reference interval for fasting triglycerides  Desirable: <150 mg/dL  Borderline: 150-199 mg/dL  High: 200-499 mg/dL  Very High: >=500 mg/dL          LDL Cholesterol 10/17/2023 147 (H)  <100 mg/dL Final    Desirable <100 mg/dL   Borderline 100-129 mg/dL   High     >=130mg/dL        VLDL 10/17/2023 25  0 - 30 mg/dL Final    Non HDL Chol 10/17/2023 171 (H)  <130 mg/dL Final    Desirable  <130 mg/dL   Borderline  130-159 mg/dL   High        160-189 mg/dL       Very high >=190 mg/dL        Patient Fasting for Lipid?  10/17/2023 Yes   Final    WBC 10/17/2023 5.4  4.0 - 11.0 x10(3) uL Final    RBC 10/17/2023 5.42  4.30 - 5.70 x10(6)uL Final    HGB 10/17/2023 16.5  13.0 - 17.5 g/dL Final    HCT 10/17/2023 49.1  39.0 - 53.0 % Final    MCV 10/17/2023 90.6  80.0 - 100.0 fL Final    MCH 10/17/2023 30.4  26.0 - 34.0 pg Final    MCHC 10/17/2023 33.6  31.0 - 37.0 g/dL Final    RDW-SD 10/17/2023 39.7  35.1 - 46.3 fL Final    RDW 10/17/2023 12.0  11.0 - 15.0 % Final    PLT 10/17/2023 148.0 (L)  150.0 - 450.0 10(3)uL Final    Neutrophil Absolute Prelim 10/17/2023 3.25  1.50 - 7.70 x10 (3) uL Final    Neutrophil Absolute 10/17/2023 3.25  1.50 - 7.70 x10(3) uL Final    Lymphocyte Absolute 10/17/2023 1.55  1.00 - 4.00 x10(3) uL Final    Monocyte Absolute 10/17/2023 0.55  0.10 - 1.00 x10(3) uL Final    Eosinophil Absolute 10/17/2023 0.03  0.00 - 0.70 x10(3) uL Final    Basophil Absolute 10/17/2023 0.03  0.00 - 0.20 x10(3) uL Final    Immature Granulocyte Absolute 10/17/2023 0.01  0.00 - 1.00 x10(3) uL Final    Neutrophil % 10/17/2023 59.9  % Final    Lymphocyte % 10/17/2023 28.6  % Final    Monocyte % 10/17/2023 10.1  % Final    Eosinophil % 10/17/2023 0.6  % Final    Basophil % 10/17/2023 0.6  % Final    Immature Granulocyte % 10/17/2023 0.2  % Final    HgbA1C 10/17/2023 5.5  <5.7 % Final     Normal HbA1C:     <5.7%      Pre-Diabetic:     5.7 - 6.4%      Diabetic:         >6.4% Diabetic Control: <7.0%        Estimated Average Glucose 10/17/2023 111  68 - 126 mg/dL Final    eAG is the estimated average glucose calculated from Hgb A1c according to the formula recommended by the American Diabetes Association. eAG levels reflect the long term average glucose and may not correlate with random or fasting glucose levels since these represent specific points in time. [facility] Lab Encounter on 07/27/2023   Component Date Value Ref Range Status    Cholesterol, Total 07/27/2023 247 (H)  <200 mg/dL Final    Desirable  <200 mg/dL  Borderline  200-239 mg/dL  High      >=240 mg/dL        HDL Cholesterol 07/27/2023 48  40 - 59 mg/dL Final    Interpretive Information:   An HDL cholesterol <40 mg/dL is low and constitutes a coronary heart disease risk factor. An HDL cholesterol >60 mg/dL is a negative risk factor for coronary heart disease.         Triglycerides 07/27/2023 186 (H)  30 - 149 mg/dL Final    Reference interval for fasting triglycerides  Desirable: <150 mg/dL  Borderline: 150-199 mg/dL  High: 200-499 mg/dL  Very High: >=500 mg/dL          LDL Cholesterol 07/27/2023 165 (H)  <100 mg/dL Final    Desirable <100 mg/dL   Borderline 100-129 mg/dL   High     >=130mg/dL        VLDL 07/27/2023 37 (H)  0 - 30 mg/dL Final    Non HDL Chol 07/27/2023 199 (H)  <130 mg/dL Final    Desirable  <130 mg/dL   Borderline  130-159 mg/dL   High        160-189 mg/dL       Very high >=190 mg/dL        Patient Fasting for Lipid? 07/27/2023 Yes   Final    Glucose 07/27/2023 87  70 - 99 mg/dL Final    Sodium 07/27/2023 139  136 - 145 mmol/L Final    Potassium 07/27/2023 4.5  3.5 - 5.1 mmol/L Final    Chloride 07/27/2023 106  98 - 112 mmol/L Final    CO2 07/27/2023 29.0  21.0 - 32.0 mmol/L Final    Anion Gap 07/27/2023 4  0 - 18 mmol/L Final    BUN 07/27/2023 13  7 - 18 mg/dL Final    Creatinine 07/27/2023 1.26  0.70 - 1.30 mg/dL Final    BUN/CREA Ratio 07/27/2023 10.3  10.0 - 20.0 Final    Calcium, Total 07/27/2023 9.5 8.5 - 10.1 mg/dL Final    Calculated Osmolality 07/27/2023 287  275 - 295 mOsm/kg Final    eGFR-Cr 07/27/2023 76  >=60 mL/min/1.73m2 Final    ALT 07/27/2023 64 (H)  16 - 61 U/L Final    AST 07/27/2023 31  15 - 37 U/L Final    Alkaline Phosphatase 07/27/2023 55  45 - 117 U/L Final    Bilirubin, Total 07/27/2023 0.8  0.1 - 2.0 mg/dL Final    Total Protein 07/27/2023 6.9  6.4 - 8.2 g/dL Final    Albumin 07/27/2023 3.6  3.4 - 5.0 g/dL Final    Globulin  07/27/2023 3.3  2.8 - 4.4 g/dL Final    A/G Ratio 07/27/2023 1.1  1.0 - 2.0 Final    Patient Fasting for CMP? 07/27/2023 Yes   Final    TSH 07/27/2023 1.720  0.358 - 3.740 mIU/mL Final    This test may exhibit interference when a sample is collected from a person who is consuming high dose of biotin (a.k.a., vitamin B7, vitamin H, coenzyme R) supplements resulting in serum concentrations >100 ng/mL. Intake of the recommended daily allowance (RDA) for biotin (0.03 mg) has not been shown to typically cause significant interference; however, high dose daily dietary supplements may contain biotin concentrations greater than 150 times (5-10 mg) the RDA. It is recommended that physicians ask all patients who may be on biotin supplementation to stop biotin consumption at least 72 hours prior to collection of a new sample. hsCRP 07/27/2023 0.40  <3.00 mg/L Final    Risk Categories     Low Risk      <1.0 mg/L     Average Risk  1.0-3.0 mg/L     High Risk     >3.0 mg/L        HgbA1C 07/27/2023 5.7 (H)  <5.7 % Final     Normal HbA1C:     <5.7%      Pre-Diabetic:     5.7 - 6.4%      Diabetic:         >6.4%      Diabetic Control: <7.0%        Estimated Average Glucose 07/27/2023 117  68 - 126 mg/dL Final    eAG is the estimated average glucose calculated from Hgb A1c according to the formula recommended by the American Diabetes Association.  eAG levels reflect the long term average glucose and may not correlate with random or fasting glucose levels since these represent specific points in time. WBC 07/27/2023 5.1  4.0 - 11.0 x10(3) uL Final    RBC 07/27/2023 5.27  4.30 - 5.70 x10(6)uL Final    HGB 07/27/2023 16.3  13.0 - 17.5 g/dL Final    HCT 07/27/2023 47.9  39.0 - 53.0 % Final    MCV 07/27/2023 90.9  80.0 - 100.0 fL Final    MCH 07/27/2023 30.9  26.0 - 34.0 pg Final    MCHC 07/27/2023 34.0  31.0 - 37.0 g/dL Final    RDW-SD 07/27/2023 41.1  35.1 - 46.3 fL Final    RDW 07/27/2023 12.4  11.0 - 15.0 % Final    PLT 07/27/2023 122.0 (L)  150.0 - 450.0 10(3)uL Final    Neutrophil Absolute Prelim 07/27/2023 2.82  1.50 - 7.70 x10 (3) uL Final    Neutrophil Absolute 07/27/2023 2.82  1.50 - 7.70 x10(3) uL Final    Lymphocyte Absolute 07/27/2023 1.72  1.00 - 4.00 x10(3) uL Final    Monocyte Absolute 07/27/2023 0.46  0.10 - 1.00 x10(3) uL Final    Eosinophil Absolute 07/27/2023 0.04  0.00 - 0.70 x10(3) uL Final    Basophil Absolute 07/27/2023 0.04  0.00 - 0.20 x10(3) uL Final    Immature Granulocyte Absolute 07/27/2023 0.01  0.00 - 1.00 x10(3) uL Final    Neutrophil % 07/27/2023 55.4  % Final    Lymphocyte % 07/27/2023 33.8  % Final    Monocyte % 07/27/2023 9.0  % Final    Eosinophil % 07/27/2023 0.8  % Final    Basophil % 07/27/2023 0.8  % Final    Immature Granulocyte % 07/27/2023 0.2  % Final    Vitamin D, 25OH, Total 07/27/2023 59.5  30.0 - 100.0 ng/mL Final    Literature Recommendations for 25(OH)D levels are:  Range           Vitamin D Status   <20    ng/mL      Deficiency   20-<30 ng/mL      Insufficiency    ng/mL      Sufficiency   >100   ng/mL      Toxicity    *Clinical controversy exists regarding optimal 25(OH)D levels. Emerging evidence links potential adverse effects to high levels, particularly >60 ng/mL. No results found. 1. Hyperlipidemia, unspecified hyperlipidemia type  - Lipid Panel; Future  - Comp Metabolic Panel (14); Future    2. Elevated fasting blood sugar  - Comp Metabolic Panel (14); Future  - Insulin;  Future  - Hemoglobin A1C; Future    3. Other acne      Time spent with patient: Over 30 minutes spent in chart review and in direct communication with patient obtaining and reviewing history, creating a unique care plan, explaining the rationale for treatment, reviewing potential SE and overall treatment plan,  documenting all clinical information in Epic. Over 50% of this time was in education, counseling and coordination of care. Problem List Items Addressed This Visit    None  Visit Diagnoses       Hyperlipidemia, unspecified hyperlipidemia type    -  Primary    Relevant Orders    Lipid Panel    Comp Metabolic Panel (14)    Elevated fasting blood sugar        Relevant Orders    Comp Metabolic Panel (14)    Insulin    Hemoglobin A1C    Other acne               Hyperlipidemia - improved with lifestyle changes over the last 2.5mos, but still not at goal.  Discussed additional improvements he could make at this time, as well as introducing more fiber with meals, omega-3's,, and starting liver defend. Cystic acne,Cystic Acne - discussed contributing factors such as impaired detoxification, elevated blood sugar, inflammatory foods, and hormones. All of the above will help, as well as starting saw palmetto to help with hormonal piece. Labs placed today in order to recheck in 3 to 4 months before next follow-up. Given further recommendations as below    Orders Placed This Visit:  Orders Placed This Encounter      Lipid Panel      Comp Metabolic Panel (14)      Insulin      Hemoglobin A1C    No orders of the defined types were placed in this encounter. Patient Instructions   Saw [place] 320 by [employer]    Saw Palmetto 320 is a blend of fatty acids and other essential nutrients to provide fundamental support for healthy prostate and urinary function. Saw [place] 320 specifically helps maintain proper testosterone metabolism for optimal prostate and urinary health.     Promotes healthy prostate and urinary function  Supports healthy testosterone metabolism    Take 1-2 capsules per day, in divided doses, between meals. 2.       Liver Defend - Liposomal Vitamin contains targeted nutrition to help the body maintain healthy liver function. * It contains four key ingredients: NAC (N-Acetyl L-Cysteine), which serves as a precursor for the synthesis of glutathione* Alpha-Lipoic Acid, a powerful antioxidant and free-radical scavenger. * Silymarin (standardized extract from milk thistle), which may help promote glutathione production. * And, selenium, a trace mineral known to support the antioxidant enzyme glutathione peroxidase    Dose; 1 tab twice per day      3. Nava Christensen by [email or web address] has been shown by original research to support healthy glucose levels, healthy lipid levels in professional athletes, bronchial health, and the body's ability to respond to stress in a healthy way. Supports joint and heart health    Two soft gels daily, with food. No follow-ups on file. Patient affirmed understanding of plan and all questions were answered.      Hannah Cotter PA-C

## 2023-12-05 ENCOUNTER — OFFICE VISIT (OUTPATIENT)
Dept: OTOLARYNGOLOGY | Facility: CLINIC | Age: 33
End: 2023-12-05
Payer: COMMERCIAL

## 2023-12-05 VITALS — HEIGHT: 74.5 IN | TEMPERATURE: 98 F | WEIGHT: 216.38 LBS | BODY MASS INDEX: 27.48 KG/M2

## 2023-12-05 DIAGNOSIS — J34.2 DEVIATED NASAL SEPTUM: Primary | ICD-10-CM

## 2023-12-05 DIAGNOSIS — R09.82 POSTNASAL DISCHARGE: ICD-10-CM

## 2023-12-05 PROCEDURE — 99213 OFFICE O/P EST LOW 20 MIN: CPT | Performed by: OTOLARYNGOLOGY

## 2023-12-05 PROCEDURE — 3008F BODY MASS INDEX DOCD: CPT | Performed by: OTOLARYNGOLOGY

## 2023-12-05 RX ORDER — AZELASTINE 1 MG/ML
2 SPRAY, METERED NASAL 2 TIMES DAILY
Qty: 90 ML | Refills: 3 | Status: SHIPPED | OUTPATIENT
Start: 2023-12-05

## 2024-01-22 ENCOUNTER — PATIENT MESSAGE (OUTPATIENT)
Dept: INTEGRATIVE MEDICINE | Facility: CLINIC | Age: 34
End: 2024-01-22

## 2024-01-23 NOTE — TELEPHONE ENCOUNTER
From: Adria Riggs  To: Leigh Ann Hoff  Sent: 1/22/2024 7:03 PM CST  Subject: Blood Work     Emigdio Beltrán,     I have my next meeting with you in February to check my blood work. I can curious to see my triglycerides and total cholesterol with the adjustments I have made. Do I have blood work on file ready for me to come in soon?     Thanks

## 2024-02-06 ENCOUNTER — LAB ENCOUNTER (OUTPATIENT)
Dept: LAB | Facility: REFERENCE LAB | Age: 34
End: 2024-02-06
Attending: PHYSICIAN ASSISTANT
Payer: COMMERCIAL

## 2024-02-06 DIAGNOSIS — R73.01 ELEVATED FASTING BLOOD SUGAR: ICD-10-CM

## 2024-02-06 DIAGNOSIS — E78.5 HYPERLIPIDEMIA, UNSPECIFIED HYPERLIPIDEMIA TYPE: ICD-10-CM

## 2024-02-06 LAB
ALBUMIN SERPL-MCNC: 4.6 G/DL (ref 3.2–4.8)
ALBUMIN/GLOB SERPL: 1.5 {RATIO} (ref 1–2)
ALP LIVER SERPL-CCNC: 67 U/L
ALT SERPL-CCNC: 39 U/L
ANION GAP SERPL CALC-SCNC: 6 MMOL/L (ref 0–18)
AST SERPL-CCNC: 27 U/L (ref ?–34)
BILIRUB SERPL-MCNC: 0.8 MG/DL (ref 0.3–1.2)
BUN BLD-MCNC: 14 MG/DL (ref 9–23)
BUN/CREAT SERPL: 11.6 (ref 10–20)
CALCIUM BLD-MCNC: 10.1 MG/DL (ref 8.7–10.4)
CHLORIDE SERPL-SCNC: 106 MMOL/L (ref 98–112)
CHOLEST SERPL-MCNC: 214 MG/DL (ref ?–200)
CO2 SERPL-SCNC: 27 MMOL/L (ref 21–32)
CREAT BLD-MCNC: 1.21 MG/DL
EGFRCR SERPLBLD CKD-EPI 2021: 80 ML/MIN/1.73M2 (ref 60–?)
EST. AVERAGE GLUCOSE BLD GHB EST-MCNC: 111 MG/DL (ref 68–126)
FASTING PATIENT LIPID ANSWER: YES
FASTING STATUS PATIENT QL REPORTED: YES
GLOBULIN PLAS-MCNC: 3 G/DL (ref 2.8–4.4)
GLUCOSE BLD-MCNC: 91 MG/DL (ref 70–99)
HBA1C MFR BLD: 5.5 % (ref ?–5.7)
HDLC SERPL-MCNC: 43 MG/DL (ref 40–59)
INSULIN SERPL-ACNC: 6.7 MU/L (ref 3–25)
LDLC SERPL CALC-MCNC: 148 MG/DL (ref ?–100)
NONHDLC SERPL-MCNC: 171 MG/DL (ref ?–130)
OSMOLALITY SERPL CALC.SUM OF ELEC: 288 MOSM/KG (ref 275–295)
POTASSIUM SERPL-SCNC: 4.3 MMOL/L (ref 3.5–5.1)
PROT SERPL-MCNC: 7.6 G/DL (ref 5.7–8.2)
SODIUM SERPL-SCNC: 139 MMOL/L (ref 136–145)
TRIGL SERPL-MCNC: 126 MG/DL (ref 30–149)
VLDLC SERPL CALC-MCNC: 24 MG/DL (ref 0–30)

## 2024-02-06 PROCEDURE — 80053 COMPREHEN METABOLIC PANEL: CPT

## 2024-02-06 PROCEDURE — 83036 HEMOGLOBIN GLYCOSYLATED A1C: CPT

## 2024-02-06 PROCEDURE — 83525 ASSAY OF INSULIN: CPT

## 2024-02-06 PROCEDURE — 36415 COLL VENOUS BLD VENIPUNCTURE: CPT

## 2024-02-06 PROCEDURE — 80061 LIPID PANEL: CPT

## 2024-02-27 ENCOUNTER — TELEMEDICINE (OUTPATIENT)
Dept: INTEGRATIVE MEDICINE | Facility: CLINIC | Age: 34
End: 2024-02-27
Payer: COMMERCIAL

## 2024-02-27 DIAGNOSIS — L70.9 ACNE, UNSPECIFIED ACNE TYPE: ICD-10-CM

## 2024-02-27 DIAGNOSIS — E78.5 HYPERLIPIDEMIA, UNSPECIFIED HYPERLIPIDEMIA TYPE: Primary | ICD-10-CM

## 2024-02-27 PROCEDURE — 99214 OFFICE O/P EST MOD 30 MIN: CPT | Performed by: PHYSICIAN ASSISTANT

## 2024-02-27 NOTE — PROGRESS NOTES
Adria iRggs is a 34 year old male.  Chief Complaint   Patient presents with    Follow - Up     Lab results       HPI:   34-year-old male patient presents today via telehealth for follow up.    Hyperlipidemia on July labs and returns today after 3 months of eating better, exercising more, and reducing alcohol intake.      Did start on liver defend, but not on MitoCore or Sunfiber.    Started [employer] with wife that includes genetic testing with nutrient specific recommendations.  Would like us to review the recommendations:     berberine balance 500mg BID,   Omega Plus 4000mg, NAC 900mg liver support,   Ashwagandha 1200 for stress,   DIM and DHEA 25mg for hormone support,   Boron to reduce SHBG   Vit D + K2  5000ius   for DNA repair, liver support  Colostrum  BPC-157 for skin acne  ..but has not started yet. Did just get the supplements.     Acne - started an IV (zinc, glutathione, ALA, biotin) in [place] and off the doxcycline. Feels it may have helped somewhat.     GI  - daily BM     REVIEW OF SYSTEMS:   Review of Systems   Constitutional: Negative.  Negative for chills, fatigue and fever.   Eyes: Negative.  Negative for visual disturbance.   Respiratory: Negative.  Negative for cough, shortness of breath and wheezing.    Cardiovascular: Negative.  Negative for chest pain.   Gastrointestinal: Negative.    Endocrine: Negative.    Genitourinary: Negative.    Musculoskeletal: Negative.    Skin: Negative.         Cystic acne   Allergic/Immunologic: Negative.    Neurological: Negative.  Negative for weakness and headaches.   Hematological: Negative.    Psychiatric/Behavioral: Negative.              FAMILY HISTORY:      Family History   Problem Relation Age of Onset    Anemia Father     Anemia Mother        MEDICAL HISTORY:   History reviewed. No pertinent past medical history.    CURRENT MEDICATIONS:     Current Outpatient Medications   Medication Sig Dispense Refill    azelastine 0.1 % Nasal Solution  2 sprays by Nasal route 2 (two) times daily. 90 mL 3    Tazarotene 0.1 % External Cream       HYDROcodone-acetaminophen 5-325 MG Oral Tab Take 1-2 tablets by mouth every 4 (four) hours as needed for Pain. 20 tablet 0    AZELASTINE 0.1 % Nasal Solution USE 2 SPRAYS IN EACH NOSTRIL TWICE DAILY 90 mL 1    fluticasone propionate 50 MCG/ACT Nasal Suspension 1 spray by Nasal route daily.      cephalexin 500 MG Oral Cap  (Patient not taking: Reported on 11/1/2023)      azelastine 0.1 % Nasal Solution 2 sprays by Nasal route 2 (two) times daily. (Patient not taking: Reported on 11/1/2023) 3 each 3    pseudoephedrine  MG Oral Tablet 12 Hr Take 1 tablet (120 mg total) by mouth every 12 (twelve) hours. (Patient not taking: Reported on 11/1/2023) 60 tablet 3    montelukast 10 MG Oral Tab Take 1 tablet (10 mg total) by mouth nightly. (Patient not taking: Reported on 12/5/2023) 30 tablet 0    pseudoephedrine  MG Oral Tablet 12 Hr Take 1 tablet (120 mg total) by mouth Q12H. (Patient not taking: Reported on 11/1/2023) 60 tablet 5    pseudoephedrine  MG Oral Tablet 12 Hr Take 1 tablet (120 mg total) by mouth every 12 (twelve) hours. (Patient not taking: Reported on 11/1/2023) 60 tablet 3    clindamycin 1 % External Lotion  (Patient not taking: Reported on 2/27/2024)      Clindamycin Phosphate 1 % External Gel  (Patient not taking: Reported on 11/1/2023)         SOCIAL HISTORY:   Lifestyle Factors affecting health:  Diet - Paleo Sun night-Fri, cut out higher glycemic foods.  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 No pertinent surgical history.    PHYSICAL EXAM:     There were no vitals filed for this visit.      Physical Exam  Vitals reviewed.   Constitutional:       General: He is not in acute distress.     Appearance: Normal appearance.   HENT:      Mouth/Throat:      Mouth: Mucous membranes are moist.      Pharynx: Oropharynx is clear.   Eyes:      Extraocular Movements: Extraocular movements intact.      Conjunctiva/sclera: Conjunctivae normal.   Cardiovascular:      Rate and Rhythm: Normal rate and regular rhythm.      Heart sounds: Normal heart sounds. No murmur heard.  Pulmonary:      Effort: Pulmonary effort is normal.      Breath sounds: Normal breath sounds.   Musculoskeletal:         General: No swelling or deformity.   Skin:     General: Skin is warm and dry.   Neurological:      General: No focal deficit present.      Mental Status: He is alert and oriented to person, place, and time.   Psychiatric:         Mood and Affect: Mood normal.         Behavior: Behavior normal.         Thought Content: Thought content normal.         Judgment: Judgment normal.          ASSESSMENT AND PLAN:     [facility] Lab Encounter on 02/06/2024   Component Date Value Ref Range Status    Cholesterol, Total 02/06/2024 214 (H)  <200 mg/dL Final    Desirable  <200 mg/dL  Borderline  200-239 mg/dL  High      >=240 mg/dL        HDL Cholesterol 02/06/2024 43  40 - 59 mg/dL Final    Interpretive Information:   An HDL cholesterol <40 mg/dL is low and constitutes a coronary heart disease risk factor. An HDL cholesterol >60 mg/dL is a negative risk factor for coronary heart disease.        Triglycerides 02/06/2024 126  30 - 149 mg/dL Final    Reference interval for fasting triglycerides  Desirable: <150 mg/dL  Borderline: 150-199 mg/dL  High: 200-499 mg/dL  Very High: >=500 mg/dL          LDL  Cholesterol 02/06/2024 148 (H)  <100 mg/dL Final    Optimal            <100 mg/dL   Near/Above OptimaL 100-129 mg/dL   Borderline High    130-159 mg/dL   High               160-189 mg/dL    Very High          >190 mg/dL         VLDL 02/06/2024 24  0 - 30 mg/dL Final    Non HDL Chol 02/06/2024 171 (H)  <130 mg/dL Final    Desirable  <130 mg/dL   Borderline  130-159 mg/dL   High        160-189 mg/dL       Very high >=190 mg/dL        Patient Fasting for Lipid? 02/06/2024 Yes   Final    Glucose 02/06/2024 91  70 - 99 mg/dL Final    Sodium 02/06/2024 139  136 - 145 mmol/L Final    Potassium 02/06/2024 4.3  3.5 - 5.1 mmol/L Final    Chloride 02/06/2024 106  98 - 112 mmol/L Final    CO2 02/06/2024 27.0  21.0 - 32.0 mmol/L Final    Anion Gap 02/06/2024 6  0 - 18 mmol/L Final    BUN 02/06/2024 14  9 - 23 mg/dL Final    Creatinine 02/06/2024 1.21  0.70 - 1.30 mg/dL Final    BUN/CREA Ratio 02/06/2024 11.6  10.0 - 20.0 Final    Calcium, Total 02/06/2024 10.1  8.7 - 10.4 mg/dL Final    Calculated Osmolality 02/06/2024 288  275 - 295 mOsm/kg Final    eGFR-Cr 02/06/2024 80  >=60 mL/min/1.73m2 Final    ALT 02/06/2024 39  10 - 49 U/L Final    AST 02/06/2024 27  <=34 U/L Final    Alkaline Phosphatase 02/06/2024 67  45 - 117 U/L Final    Bilirubin, Total 02/06/2024 0.8  0.3 - 1.2 mg/dL Final    Total Protein 02/06/2024 7.6  5.7 - 8.2 g/dL Final    Albumin 02/06/2024 4.6  3.2 - 4.8 g/dL Final    Globulin  02/06/2024 3.0  2.8 - 4.4 g/dL Final    A/G Ratio 02/06/2024 1.5  1.0 - 2.0 Final    Patient Fasting for CMP? 02/06/2024 Yes   Final    Insulin 02/06/2024 6.7  3.0 - 25.0 mU/L Final    HgbA1C 02/06/2024 5.5  <5.7 % Final     Normal HbA1C:     <5.7%      Pre-Diabetic:     5.7 - 6.4%      Diabetic:         >6.4%      Diabetic Control: <7.0%        Estimated Average Glucose 02/06/2024 111  68 - 126 mg/dL Final    eAG is the estimated average glucose calculated from Hgb A1c according to the formula recommended by the American Diabetes  Association. eAG levels reflect the long term average glucose and may not correlate with random or fasting glucose levels since these represent specific points in time.          [facility] Lab Encounter on 10/17/2023   Component Date Value Ref Range Status    Testosterone Tot LC/MS 10/17/2023 873.8  264.0 - 916.0 ng/dL Final    This [facility] LC/MS-MS method is currently certified by the CDC  Hormone Standardization Program (HoSt). Adult male reference  interval is based on a population of healthy nonobese males  (BMI <30) between 19 and 39 years old. Deisy et.al. JCEM  2017,102;4553-9830. PMID: 57539874.    Testost % Free+Weak Bnd 10/17/2023 11.6  9.0 - 46.0 % Final    This test was developed and its performance characteristics  determined by [facility]. It has not been cleared or approved  by the Food and Drug Administration.    Testost Free+Weak Bnd 10/17/2023 101.4  40.0 - 250.0 ng/dL Final    Sex Horm Bind Glob 10/17/2023 45.8  16.5 - 55.9 nmol/L Final    DHEA Sulfate 10/17/2023 148.8  34.5 - 568.9 ug/dL Final    This test may exhibit interference of greater than 10% when a sample is collected from a person who is consuming high dose of biotin (a.k.a., vitamin B7, vitamin H, coenzyme R) supplements resulting in serum concentrations &gt;12.5 ng/mL, leading to either falsely elevated or falsely depressed results.  Intake of the recommended daily allowance (RDA) for biotin (0.03 mg) has not been shown to typically cause significant interference; however, high dose daily dietary supplements may contain biotin concentrations greater than 150 times (5-10 mg) the RDA.  It is recommended that physicians ask all patients who may be on biotin supplementation to stop biotin consumption at least 72 hours prior to collection of a new sample.    Glucose 10/17/2023 103 (H)  70 - 99 mg/dL Final    Sodium 10/17/2023 141  136 - 145 mmol/L Final    Potassium 10/17/2023 4.4  3.5 - 5.1 mmol/L Final    Chloride 10/17/2023 109  98 - 112  mmol/L Final    CO2 10/17/2023 27.0  21.0 - 32.0 mmol/L Final    Anion Gap 10/17/2023 5  0 - 18 mmol/L Final    BUN 10/17/2023 17  7 - 18 mg/dL Final    Creatinine 10/17/2023 1.34 (H)  0.70 - 1.30 mg/dL Final    BUN/CREA Ratio 10/17/2023 12.7  10.0 - 20.0 Final    Calcium, Total 10/17/2023 9.8  8.5 - 10.1 mg/dL Final    Calculated Osmolality 10/17/2023 294  275 - 295 mOsm/kg Final    eGFR-Cr 10/17/2023 71  >=60 mL/min/1.73m2 Final    ALT 10/17/2023 64 (H)  16 - 61 U/L Final    AST 10/17/2023 35  15 - 37 U/L Final    Alkaline Phosphatase 10/17/2023 66  45 - 117 U/L Final    Bilirubin, Total 10/17/2023 0.7  0.1 - 2.0 mg/dL Final    Total Protein 10/17/2023 7.7  6.4 - 8.2 g/dL Final    Albumin 10/17/2023 4.3  3.4 - 5.0 g/dL Final    Globulin  10/17/2023 3.4  2.8 - 4.4 g/dL Final    A/G Ratio 10/17/2023 1.3  1.0 - 2.0 Final    Patient Fasting for CMP? 10/17/2023 Yes   Final    Cholesterol, Total 10/17/2023 217 (H)  <200 mg/dL Final    Desirable  <200 mg/dL  Borderline  200-239 mg/dL  High      >=240 mg/dL        HDL Cholesterol 10/17/2023 46  40 - 59 mg/dL Final    Interpretive Information:   An HDL cholesterol <40 mg/dL is low and constitutes a coronary heart disease risk factor. An HDL cholesterol >60 mg/dL is a negative risk factor for coronary heart disease.        Triglycerides 10/17/2023 135  30 - 149 mg/dL Final    Reference interval for fasting triglycerides  Desirable: <150 mg/dL  Borderline: 150-199 mg/dL  High: 200-499 mg/dL  Very High: >=500 mg/dL          LDL Cholesterol 10/17/2023 147 (H)  <100 mg/dL Final    Desirable <100 mg/dL   Borderline 100-129 mg/dL   High     >=130mg/dL        VLDL 10/17/2023 25  0 - 30 mg/dL Final    Non HDL Chol 10/17/2023 171 (H)  <130 mg/dL Final    Desirable  <130 mg/dL   Borderline  130-159 mg/dL   High        160-189 mg/dL       Very high >=190 mg/dL        Patient Fasting for Lipid? 10/17/2023 Yes   Final    WBC 10/17/2023 5.4  4.0 - 11.0 x10(3) uL Final    RBC 10/17/2023  5.42  4.30 - 5.70 x10(6)uL Final    HGB 10/17/2023 16.5  13.0 - 17.5 g/dL Final    HCT 10/17/2023 49.1  39.0 - 53.0 % Final    MCV 10/17/2023 90.6  80.0 - 100.0 fL Final    MCH 10/17/2023 30.4  26.0 - 34.0 pg Final    MCHC 10/17/2023 33.6  31.0 - 37.0 g/dL Final    RDW-SD 10/17/2023 39.7  35.1 - 46.3 fL Final    RDW 10/17/2023 12.0  11.0 - 15.0 % Final    PLT 10/17/2023 148.0 (L)  150.0 - 450.0 10(3)uL Final    Neutrophil Absolute Prelim 10/17/2023 3.25  1.50 - 7.70 x10 (3) uL Final    Neutrophil Absolute 10/17/2023 3.25  1.50 - 7.70 x10(3) uL Final    Lymphocyte Absolute 10/17/2023 1.55  1.00 - 4.00 x10(3) uL Final    Monocyte Absolute 10/17/2023 0.55  0.10 - 1.00 x10(3) uL Final    Eosinophil Absolute 10/17/2023 0.03  0.00 - 0.70 x10(3) uL Final    Basophil Absolute 10/17/2023 0.03  0.00 - 0.20 x10(3) uL Final    Immature Granulocyte Absolute 10/17/2023 0.01  0.00 - 1.00 x10(3) uL Final    Neutrophil % 10/17/2023 59.9  % Final    Lymphocyte % 10/17/2023 28.6  % Final    Monocyte % 10/17/2023 10.1  % Final    Eosinophil % 10/17/2023 0.6  % Final    Basophil % 10/17/2023 0.6  % Final    Immature Granulocyte % 10/17/2023 0.2  % Final    HgbA1C 10/17/2023 5.5  <5.7 % Final     Normal HbA1C:     <5.7%      Pre-Diabetic:     5.7 - 6.4%      Diabetic:         >6.4%      Diabetic Control: <7.0%        Estimated Average Glucose 10/17/2023 111  68 - 126 mg/dL Final    eAG is the estimated average glucose calculated from Hgb A1c according to the formula recommended by the American Diabetes Association. eAG levels reflect the long term average glucose and may not correlate with random or fasting glucose levels since these represent specific points in time.             No results found.    1. Hyperlipidemia, unspecified hyperlipidemia type    2. Acne, unspecified acne type      This visit was conducted using Telemedicine with live, interactive video and audio.   The patient understands the risks and benefits of Telemedicine  and that a Telemedicine visit limits the ability to perform a thorough physical examination which may affect objective findings related to specific symptoms and conditions which can, in turn, affect treatment.     The patient was located in the [facility] at the time of the encounter.    Time spent with patient: Over 30 minutes spent in chart review and in direct communication with patient obtaining and reviewing history, creating a unique care plan, explaining the rationale for treatment, reviewing potential SE and overall treatment plan,  documenting all clinical information in Epic. Over 50% of this time was in education, counseling and coordination of care.     Problem List Items Addressed This Visit    None  Visit Diagnoses       Hyperlipidemia, unspecified hyperlipidemia type    -  Primary    Acne, unspecified acne type                 Hyperlipidemia - improved with lifestyle changes over the last 5mos, but still not at goal.  Discussed additional improvements he could make at this time, as well as introducing more fiber with meals, omega-3's, and the supplements he just received through [facility].  He will have a follow-up blood test with them that we can review at his next follow-up.  Also discussed how stress reduction can favorably impact CV disease.  Blood sugar and blood pressure stable at this time.    Cystic acne - discussed contributing factors such as impaired detoxification, elevated blood sugar, inflammatory foods, and hormones.  Has seen improvements in the last few months.  Did take the saw palmetto.  Now planning to start DIM, DHEA, and topical BPC-157.    Given further recommendations as below    Orders Placed This Visit:  No orders of the defined types were placed in this encounter.    No orders of the defined types were placed in this encounter.      There are no Patient Instructions on file for this visit.    No follow-ups on file.    Patient affirmed understanding of plan and all  questions were answered.     Leigh Ann Hoff PA-C

## 2024-07-16 ENCOUNTER — TELEMEDICINE (OUTPATIENT)
Dept: INTEGRATIVE MEDICINE | Facility: CLINIC | Age: 34
End: 2024-07-16
Payer: COMMERCIAL

## 2024-07-16 DIAGNOSIS — E34.8 ENDOCRINE AXIS DYSFUNCTION: ICD-10-CM

## 2024-07-16 DIAGNOSIS — Z87.898 H/O ELEVATED HOMOCYSTEINE: ICD-10-CM

## 2024-07-16 DIAGNOSIS — R73.03 PREDIABETES: ICD-10-CM

## 2024-07-16 DIAGNOSIS — E78.5 HYPERLIPIDEMIA, UNSPECIFIED HYPERLIPIDEMIA TYPE: Primary | ICD-10-CM

## 2024-07-16 PROCEDURE — 99215 OFFICE O/P EST HI 40 MIN: CPT | Performed by: PHYSICIAN ASSISTANT

## 2024-07-16 PROCEDURE — G2211 COMPLEX E/M VISIT ADD ON: HCPCS | Performed by: PHYSICIAN ASSISTANT

## 2024-07-16 RX ORDER — BLOOD-GLUCOSE,RECEIVER,CONT
1 EACH MISCELLANEOUS
Qty: 1 EACH | Refills: 0 | Status: SHIPPED | OUTPATIENT
Start: 2024-07-16

## 2024-07-16 RX ORDER — BLOOD-GLUCOSE SENSOR
1 EACH MISCELLANEOUS
Qty: 1 EACH | Refills: 0 | Status: SHIPPED | OUTPATIENT
Start: 2024-07-16

## 2024-07-16 NOTE — PROGRESS NOTES
Adria Riggs is a 34 year old male.  Chief Complaint   Patient presents with    Follow - Up     Lab results       HPI:   34-year-old male patient presents today via telehealth with wife for follow up.  Had labs done through 10x and will review today from 6/25/24.    Since last OV:  Has been following the Paleo and 10x plan - eats very well. Red meat only 2-3x/wk, lots of veggies. Very little carbs, ni fruit.    GI - colostrum has helped with acne and sensitive stomach - would need Tums a lot    EtOh - couple drinks socially approx 2weekends/mo; vodka/soda or beer    Energy is good  Elevated SHBG  - No symptoms  of low T    COMT snp but not the MTHFR    --------------------------------------------Last OV---------------------------------------------------    Hyperlipidemia on July labs and returns today after 3 months of eating better, exercising more, and reducing alcohol intake.      Did start on liver defend, but not on MitoCore or Sunfiber.    Started [facility] with wife that includes genetic testing with nutrient specific recommendations.  Would like us to review the recommendations:     berberine balance 500mg BID,   Omega Plus 2800mg (4qd)  NAC 900mg liver support,   Ashwagandha 1200 for stress,   DIM BID and DHEA 25mg for hormone support,   --Boron to reduce SHBG - not on  --Vit D + K2  5000ius - not on   for DNA repair (carnitine), liver support  Colostrum - 30min before meals BID  B-Complex (don)   Nicotinamide  BPC-157 for skin acne  ..but has not started yet. Did just get the supplements.     Acne - started an IV (zinc, glutathione, ALA, biotin) in [place] and off the doxcycline. Feels it may have helped somewhat.     GI  - daily BM     REVIEW OF SYSTEMS:   Review of Systems     Negative except for pertinent ROS in HPI      FAMILY HISTORY:      Family History   Problem Relation Age of Onset    Anemia Father     Anemia Mother      No diabetes in the family  High cholesterol and high blood  pressure  MEDICAL HISTORY:   History reviewed. No pertinent past medical history.    CURRENT MEDICATIONS:     Current Outpatient Medications   Medication Sig Dispense Refill    Continuous Glucose Sensor (FREESTYLE ANA 3 SENSOR) Does not apply Misc 1 each every 14 (fourteen) days. 1 each 0    Continuous Glucose  (FREESTYLE ANA 3 READER) Does not apply Misc 1 each every 14 (fourteen) days. 1 each 0    azelastine 0.1 % Nasal Solution 2 sprays by Nasal route 2 (two) times daily. 90 mL 3    HYDROcodone-acetaminophen 5-325 MG Oral Tab Take 1-2 tablets by mouth every 4 (four) hours as needed for Pain. 20 tablet 0    AZELASTINE 0.1 % Nasal Solution USE 2 SPRAYS IN EACH NOSTRIL TWICE DAILY 90 mL 1    fluticasone propionate 50 MCG/ACT Nasal Suspension 1 spray by Nasal route daily.      Tazarotene 0.1 % External Cream  (Patient not taking: Reported on 7/16/2024)      cephalexin 500 MG Oral Cap  (Patient not taking: Reported on 11/1/2023)      azelastine 0.1 % Nasal Solution 2 sprays by Nasal route 2 (two) times daily. (Patient not taking: Reported on 11/1/2023) 3 each 3    pseudoephedrine  MG Oral Tablet 12 Hr Take 1 tablet (120 mg total) by mouth every 12 (twelve) hours. (Patient not taking: Reported on 11/1/2023) 60 tablet 3    montelukast 10 MG Oral Tab Take 1 tablet (10 mg total) by mouth nightly. (Patient not taking: Reported on 12/5/2023) 30 tablet 0    pseudoephedrine  MG Oral Tablet 12 Hr Take 1 tablet (120 mg total) by mouth Q12H. (Patient not taking: Reported on 11/1/2023) 60 tablet 5    pseudoephedrine  MG Oral Tablet 12 Hr Take 1 tablet (120 mg total) by mouth every 12 (twelve) hours. (Patient not taking: Reported on 11/1/2023) 60 tablet 3    clindamycin 1 % External Lotion  (Patient not taking: Reported on 2/27/2024)      Clindamycin Phosphate 1 % External Gel  (Patient not taking: Reported on 11/1/2023)         SOCIAL HISTORY:   Lifestyle Factors affecting health:  Diet - Paleo Sun  night-Fri, cut out higher glycemic foods. Low carbs. PB protein bars. Turkey based chomp sticks.   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 No pertinent surgical history.    PHYSICAL EXAM:     There were no vitals filed for this visit.      Physical Exam  Constitutional:       General: He is not in acute distress.     Appearance: Normal appearance. He is not ill-appearing or toxic-appearing.   HENT:      Head: Normocephalic and atraumatic.   Eyes:      Extraocular Movements: Extraocular movements intact.   Pulmonary:      Effort: Pulmonary effort is normal. No respiratory distress.   Musculoskeletal:      Cervical back: Normal range of motion.   Skin:     Coloration: Skin is not jaundiced.      Findings: No lesion.   Neurological:      Mental Status: He is alert and oriented to person, place, and time.   Psychiatric:         Mood and Affect: Mood normal.         Behavior: Behavior normal.         Thought Content: Thought content normal.         Judgment: Judgment normal.          ASSESSMENT AND PLAN:     [facility] Lab Encounter on 02/06/2024   Component Date Value Ref Range Status    Cholesterol, Total 02/06/2024 214 (H)  <200 mg/dL Final    Desirable  <200 mg/dL  Borderline  200-239 mg/dL  High      >=240 mg/dL        HDL Cholesterol 02/06/2024 43  40 - 59 mg/dL Final    Interpretive Information:   An HDL cholesterol <40 mg/dL is low and  constitutes a coronary heart disease risk factor. An HDL cholesterol >60 mg/dL is a negative risk factor for coronary heart disease.        Triglycerides 02/06/2024 126  30 - 149 mg/dL Final    Reference interval for fasting triglycerides  Desirable: <150 mg/dL  Borderline: 150-199 mg/dL  High: 200-499 mg/dL  Very High: >=500 mg/dL          LDL Cholesterol 02/06/2024 148 (H)  <100 mg/dL Final    Optimal            <100 mg/dL   Near/Above OptimaL 100-129 mg/dL   Borderline High    130-159 mg/dL   High               160-189 mg/dL    Very High          >190 mg/dL         VLDL 02/06/2024 24  0 - 30 mg/dL Final    Non HDL Chol 02/06/2024 171 (H)  <130 mg/dL Final    Desirable  <130 mg/dL   Borderline  130-159 mg/dL   High        160-189 mg/dL       Very high >=190 mg/dL        Patient Fasting for Lipid? 02/06/2024 Yes   Final    Glucose 02/06/2024 91  70 - 99 mg/dL Final    Sodium 02/06/2024 139  136 - 145 mmol/L Final    Potassium 02/06/2024 4.3  3.5 - 5.1 mmol/L Final    Chloride 02/06/2024 106  98 - 112 mmol/L Final    CO2 02/06/2024 27.0  21.0 - 32.0 mmol/L Final    Anion Gap 02/06/2024 6  0 - 18 mmol/L Final    BUN 02/06/2024 14  9 - 23 mg/dL Final    Creatinine 02/06/2024 1.21  0.70 - 1.30 mg/dL Final    BUN/CREA Ratio 02/06/2024 11.6  10.0 - 20.0 Final    Calcium, Total 02/06/2024 10.1  8.7 - 10.4 mg/dL Final    Calculated Osmolality 02/06/2024 288  275 - 295 mOsm/kg Final    eGFR-Cr 02/06/2024 80  >=60 mL/min/1.73m2 Final    ALT 02/06/2024 39  10 - 49 U/L Final    AST 02/06/2024 27  <=34 U/L Final    Alkaline Phosphatase 02/06/2024 67  45 - 117 U/L Final    Bilirubin, Total 02/06/2024 0.8  0.3 - 1.2 mg/dL Final    Total Protein 02/06/2024 7.6  5.7 - 8.2 g/dL Final    Albumin 02/06/2024 4.6  3.2 - 4.8 g/dL Final    Globulin  02/06/2024 3.0  2.8 - 4.4 g/dL Final    A/G Ratio 02/06/2024 1.5  1.0 - 2.0 Final    Patient Fasting for CMP? 02/06/2024 Yes   Final    Insulin 02/06/2024 6.7  3.0 - 25.0 mU/L Final    HgbA1C  02/06/2024 5.5  <5.7 % Final     Normal HbA1C:     <5.7%      Pre-Diabetic:     5.7 - 6.4%      Diabetic:         >6.4%      Diabetic Control: <7.0%        Estimated Average Glucose 02/06/2024 111  68 - 126 mg/dL Final    eAG is the estimated average glucose calculated from Hgb A1c according to the formula recommended by the American Diabetes Association. eAG levels reflect the long term average glucose and may not correlate with random or fasting glucose levels since these represent specific points in time.             No results found.    1. Hyperlipidemia, unspecified hyperlipidemia type  - Lipid Panel; Future  - Testosterone,Total and Weakly Bound w/ SHBG; Future  - Homocysteine; Future  - Dehydroepiandrosterone Sulfate; Future  - Apolipoprotein B [E]; Future  - Lipoprotein (A) [E]; Future    2. Prediabetes  - Continuous Glucose Sensor (FREESTYLE ANA 3 SENSOR) Does not apply Misc; 1 each every 14 (fourteen) days.  Dispense: 1 each; Refill: 0  - Continuous Glucose  (FREESTYLE ANA 3 READER) Does not apply Misc; 1 each every 14 (fourteen) days.  Dispense: 1 each; Refill: 0  - Hemoglobin A1C; Future  - Insulin; Future  - Homocysteine; Future    3. H/O elevated homocysteine  - Homocysteine; Future    4. Endocrine axis dysfunction  - Lipid Panel; Future  - Hemoglobin A1C; Future  - Insulin; Future  - TSH W Reflex To Free T4; Future  - Testosterone,Total and Weakly Bound w/ SHBG; Future  - Dehydroepiandrosterone Sulfate; Future  - Lipoprotein (A) [E]; Future        This visit was conducted using Telemedicine with live, interactive video and audio.   The patient understands the risks and benefits of Telemedicine and that a Telemedicine visit limits the ability to perform a thorough physical examination which may affect objective findings related to specific symptoms and conditions which can, in turn, affect treatment.     The patient was located in the [facility] at the time of the encounter.    Time  spent with patient: Over 50 minutes spent in chart review and in direct communication with patient obtaining and reviewing history, creating a unique care plan, explaining the rationale for treatment, reviewing potential SE and overall treatment plan,  documenting all clinical information in Epic. Over 50% of this time was in education, counseling and coordination of care.     Problem List Items Addressed This Visit    None  Visit Diagnoses       Hyperlipidemia, unspecified hyperlipidemia type    -  Primary    Relevant Orders    Lipid Panel    Testosterone,Total and Weakly Bound w/ SHBG    Homocysteine    Dehydroepiandrosterone Sulfate    Apolipoprotein B [E]    Lipoprotein (A) [E]    Prediabetes        Relevant Medications    Continuous Glucose Sensor (FREESTYLE ANA 3 SENSOR) Does not apply Misc    Continuous Glucose  (FREESTYLE ANA 3 READER) Does not apply Misc    Other Relevant Orders    Hemoglobin A1C    Insulin    Homocysteine    H/O elevated homocysteine        Relevant Orders    Homocysteine    Endocrine axis dysfunction        Relevant Orders    Lipid Panel    Hemoglobin A1C    Insulin    TSH W Reflex To Free T4    Testosterone,Total and Weakly Bound w/ SHBG    Dehydroepiandrosterone Sulfate    Lipoprotein (A) [E]          Reviewed labs that were done through [facility] on 6/21/2024.     Cardiometabolic:  Hyperlipidemia - TGL improved with lifestyle changes, but most recent lab testing showed slight increase in LDL, and HDL remains low.  Discussed additional improvements he could make at this time, as well as introducing more fiber with meals.   Hemoglobin A1c increased from 5.6-5.9.   -> Recommend continuous glucose monitor to elucidate what could be causing the increase in his daily lifestyle.  -> Recommend increasing strength training workouts, especially HIIT to reduce cholesterol, support blood sugar stabilization throughout the day, and decrease SHBG  -> TSH slightly increased but free T4 and  free T3 are in good range.  Homocystine above optimal range, recommend advaclear to support.   -> Recheck lipids and include LP(a) and apolipoprotein B to assess for CV risk.    Endocrine:  DHEA has improved on supplementation and total testosterone levels are good.  High SHBG, patient never started on boron.  Would recommend 10 mg a day for the next 3 months.    Cystic acne - discussed contributing factors such as impaired detoxification, elevated blood sugar, inflammatory foods, and hormones.  Has seen improvements in the last few months.  Did take the saw palmetto.  Now planning to start DIM, DHEA, and topical BPC-157.    Given further recommendations as below    Orders Placed This Visit:  Orders Placed This Encounter   Procedures    Lipid Panel    Hemoglobin A1C    Insulin    TSH W Reflex To Free T4    Testosterone,Total and Weakly Bound w/ SHBG    Homocysteine    Dehydroepiandrosterone Sulfate    Apolipoprotein B [E]    Lipoprotein (A) [E]     No orders of the defined types were placed in this encounter.      Patient Instructions   Strength training increased to 3d/wk. Incorporate more fiber, more low glycemic fruits to increase antioxidants and reduce inflammation.   Boron 10mg /day to help reduce the SHBG  Discontinue berberine and start the:  BBR Complete by [employer]    Advanced Lipid Level Support with Bergamot.  BBR Complete is a synergistic blend that naturally supports healthy blood cholesterol levels already within normal range, blood sugar balance, and overall cardiovascular function.  Take 4-5 capsules per day. May split dose to take 2-3x/day.      Serving Size: 5 Capsules  Amount Per Serving  Vitamin C ... 50mg  (as Ascorbic Acid)  Niacin ... 125mg  (as Nicotinic Acid)  Red Yeast Rice ... 1250mg  (as Monascus Purpureus)  Berberine HCL ... 500mg  (97%)  Bergamot Extract ... 1000mg  (Citrus Bergamia)(standardized to contain a minimum of 38% BPF containing Neohesperidin, Naringin,  Neoeriocitrin, Brutieridin, and Melitidin)  Grape Seed Extract ... 25mg  (95% Proanthocyanidins)     4. Continuous Glucose Monitor - sent to [employer], Log what you ate/what activity you were doing when checking the blood sugar level.     5.     Tomorrow’s Nutrition Sunfiber® delivers 6 grams of clinically proven, clear, grit free soluble fiber. A true regulating fiber, improves both conditions of occasional constipation and poor elimination as well as occasional diarrhea and loose stools. The result is regularity with healthy consistency    Dose: Take 1 scoop daily      6. Replace the B-Complex with:  AdvaClear® by [employer]    AdvaClear® supports daily detoxification for optimal vitality and health. This formula provides methylated B12 and folate along with bifunctional nutritional support designed to enhance the activities of several liver detoxification enzymes and support Phase I and Phase II detoxification pathways.*    Suggested Use:  Take two capsules once daily     7. May discontinue nicotinamide.  --------------------------------------------------------------------------------------------  Where to Purchase: [email or web address]  This is our [facility] online dispensary for vitamins and supplements where you receive a 10% discount off of supplement prices! Select the \"Log In\" and then use your email address to complete creating your personal account. Please call Ludi at [phone], if you need direct help.     The products and items listed below (the “Products”)  and their claims have not been evaluated by the Food and Drug Administration. Dietary products are not intended to treat, prevent, mitigate or cure disease. Ultimately, you must draw your own conclusion as to the efficacy of the Product and immediately stop use of the Products if a negative reaction should arise.  You should always consult a licensed health care professional before starting any supplement,  dietary, or exercise program, especially if you are pregnant or have any pre-existing injuries or medical conditions. The patient agrees that the [facility] and its affiliates and its [facility] (“[facility]”) are not liable for the patients use of the Products. The [facility] makes no representations or warranties of any kind, expressed or implied, as to the Products, including, but not limited to its efficacy, benefits or outcomes.  Patient agrees to contact his/her healthcare professional and stop use of Products should any reactions arise      Return in about 3 months (around 10/16/2024) for x30min: CGM, hyperlipidemia, prediabetes.    Patient affirmed understanding of plan and all questions were answered.     Leigh Ann Hoff PA-C

## 2024-07-16 NOTE — PATIENT INSTRUCTIONS
Strength training increased to 3d/wk. Incorporate more fiber, more low glycemic fruits to increase antioxidants and reduce inflammation.   Boron 10mg /day to help reduce the SHBG  Discontinue berberine and start the:  BBR Complete by [employer]    Advanced Lipid Level Support with Bergamot.  BBR Complete is a synergistic blend that naturally supports healthy blood cholesterol levels already within normal range, blood sugar balance, and overall cardiovascular function.  Take 4-5 capsules per day. May split dose to take 2-3x/day.      Serving Size: 5 Capsules  Amount Per Serving  Vitamin C ... 50mg  (as Ascorbic Acid)  Niacin ... 125mg  (as Nicotinic Acid)  Red Yeast Rice ... 1250mg  (as Monascus Purpureus)  Berberine HCL ... 500mg  (97%)  Bergamot Extract ... 1000mg  (Citrus Bergamia)(standardized to contain a minimum of 38% BPF containing Neohesperidin, Naringin, Neoeriocitrin, Brutieridin, and Melitidin)  Grape Seed Extract ... 25mg  (95% Proanthocyanidins)     4. Continuous Glucose Monitor - sent to [employer], Log what you ate/what activity you were doing when checking the blood sugar level.     5.     Tomorrow’s Nutrition Sunfiber® delivers 6 grams of clinically proven, clear, grit free soluble fiber. A true regulating fiber, improves both conditions of occasional constipation and poor elimination as well as occasional diarrhea and loose stools. The result is regularity with healthy consistency    Dose: Take 1 scoop daily      6. Replace the B-Complex with:  AdvaClear® by [employer]    AdvaClear® supports daily detoxification for optimal vitality and health. This formula provides methylated B12 and folate along with bifunctional nutritional support designed to enhance the activities of several liver detoxification enzymes and support Phase I and Phase II detoxification pathways.*    Suggested Use:  Take two capsules once daily     7. May discontinue  nicotinamide.  --------------------------------------------------------------------------------------------  Where to Purchase: [email or web address]  This is our [facility] online dispensary for vitamins and supplements where you receive a 10% discount off of supplement prices! Select the \"Log In\" and then use your email address to complete creating your personal account. Please call [employer] at [phone], if you need direct help.     The products and items listed below (the “Products”)  and their claims have not been evaluated by the Food and Drug Administration. Dietary products are not intended to treat, prevent, mitigate or cure disease. Ultimately, you must draw your own conclusion as to the efficacy of the Product and immediately stop use of the Products if a negative reaction should arise.  You should always consult a licensed health care professional before starting any supplement, dietary, or exercise program, especially if you are pregnant or have any pre-existing injuries or medical conditions. The patient agrees that the [facility] and its affiliates and its [facility] (“[facility]”) are not liable for the patients use of the Products. [facility] makes no representations or warranties of any kind, expressed or implied, as to the Products, including, but not limited to its efficacy, benefits or outcomes.  Patient agrees to contact his/her healthcare professional and stop use of Products should any reactions arise

## 2024-07-18 ENCOUNTER — MED REC SCAN ONLY (OUTPATIENT)
Dept: INTEGRATIVE MEDICINE | Facility: CLINIC | Age: 34
End: 2024-07-18

## 2024-11-09 ENCOUNTER — HOSPITAL ENCOUNTER (OUTPATIENT)
Age: 34
Discharge: HOME | End: 2024-11-09
Payer: COMMERCIAL

## 2024-11-09 VITALS
RESPIRATION RATE: 18 BRPM | TEMPERATURE: 98 F | SYSTOLIC BLOOD PRESSURE: 135 MMHG | OXYGEN SATURATION: 98 % | HEART RATE: 66 BPM | DIASTOLIC BLOOD PRESSURE: 78 MMHG

## 2024-11-09 DIAGNOSIS — L01.00 IMPETIGO: Primary | ICD-10-CM

## 2024-11-09 PROCEDURE — 99213 OFFICE O/P EST LOW 20 MIN: CPT | Performed by: NURSE PRACTITIONER

## 2024-11-09 RX ORDER — MUPIROCIN 20 MG/G
1 OINTMENT TOPICAL 3 TIMES DAILY
Qty: 22 G | Refills: 0 | Status: SHIPPED | OUTPATIENT
Start: 2024-11-09 | End: 2024-11-23

## 2024-11-09 NOTE — ED PROVIDER NOTES
Patient Seen in: [facility]      History     Chief Complaint   Patient presents with    Skin Problem     Stated Complaint: Skin issue    Subjective:   This is a 34-year-old  male presents with a chief complaint of impetigo lesions on his face and neck.  Patient states that his son and daughter have both been treated for impetigo over the past 1 to 2 weeks.  Their lesions are improving with the use of mupirocin.  He states that he noted lesions on his own right side of his forehead the right side of his face and neck within the last week.  He has started using the mupirocin twice a day and he thinks they are slowly improving but they remain visible and he is concerned about a couple of new lesions that have occurred.  Patient denies any drainage, fever, general myalgias, or pain associated with the lesions.  He states that when he shaves he is disposing of the razor after he finishes shaving.  He denies any other associated complaints    The history is provided by the patient.             Objective:     History reviewed. No pertinent past medical history.           History reviewed. No pertinent surgical history.             Social History     Socioeconomic History    Marital status:    Tobacco Use    Smoking status: Never     Passive exposure: Never    Smokeless tobacco: Never   Vaping Use    Vaping status: Never Used   Substance and Sexual Activity    Alcohol use: Yes     Comment: social    Drug use: Never   Other Topics Concern    Special Diet No              Review of Systems   Constitutional:  Negative for activity change and fever.   HENT:  Negative for congestion.    Eyes:  Negative for discharge.   Respiratory:  Negative for cough and shortness of breath.    Cardiovascular:  Negative for chest pain.   Gastrointestinal:  Negative for nausea and vomiting.   Musculoskeletal:  Negative for arthralgias and myalgias.   Skin:  Positive for rash (See HPI).       Positive for stated  complaint: Skin issue  Other systems are as noted in HPI.  Constitutional and vital signs reviewed.      All other systems reviewed and negative except as noted above.    Physical Exam     ED Triage Vitals   BP 11/09/24 1328 135/78   Pulse 11/09/24 1328 66   Resp 11/09/24 1328 18   Temp 11/09/24 1328 97.5 °F (36.4 °C)   Temp src 11/09/24 1328 Temporal   SpO2 11/09/24 1328 98 %   O2 Device 11/09/24 1327 None (Room air)       Current Vitals:   Vital Signs  BP: 135/78  Pulse: 66  Resp: 18  Temp: 97.5 °F (36.4 °C)  Temp src: Temporal    Oxygen Therapy  SpO2: 98 %  O2 Device: None (Room air)        Physical Exam  Vitals and nursing note reviewed.   Constitutional:       General: He is not in acute distress.     Appearance: Normal appearance. He is normal weight. He is not ill-appearing or toxic-appearing.   HENT:      Head: Normocephalic.      Nose: Nose normal. No congestion or rhinorrhea.      Mouth/Throat:      Mouth: Mucous membranes are moist.   Eyes:      General:         Right eye: No discharge.         Left eye: No discharge.      Conjunctiva/sclera: Conjunctivae normal.   Cardiovascular:      Rate and Rhythm: Normal rate.   Pulmonary:      Effort: Pulmonary effort is normal. No respiratory distress.   Musculoskeletal:      Cervical back: Neck supple.   Skin:     General: Skin is warm and dry.      Findings: Lesion present.   Neurological:      General: No focal deficit present.      Mental Status: He is alert and oriented to person, place, and time.      Coordination: Coordination normal.      Gait: Gait normal.   Psychiatric:         Mood and Affect: Mood normal.         Behavior: Behavior normal.         Thought Content: Thought content normal.         Judgment: Judgment normal.             ED Course   Labs Reviewed - No data to display  Discussed with patient that impetigo may take several days or weeks to completely resolve.  Advised to continue using the mupirocin ointment 3 times a day and new prescription  supplied.  He was using his children's mupirocin.  Discussed infection control measures.  Advised to watch for any signs of secondary infection and if those would occur, such as redness surrounding the lesion, swelling, pain, fever, or other concerns, then he should return here or see his primary care provider.  Patient agrees with this plan of care and will [facility]     Counseled: Patient, regarding diagnosis, regarding treatment plan, regarding diagnostic results, regarding prescription, I have discussed with the patient the results of tests, differential diagnosis, and warning signs and symptoms that should prompt immediate return. The patient understands these instructions and agrees to the follow-up plan provided. There is no barriers to learning. Appropriate f/u given. Emergency precautions given. Patient agrees to return for any concerns/ problems/complications.                MDM             Medical Decision Making  Differential diagnoses: Tinea corporis, impetigo, contact dermatitis, cellulitis, insect bites.  The lesions do not resemble tinea, insect bites, or cellulitis.  Contact dermatitis is less likely.  Although these are not honey colored crusted lesions of impetigo with a history that both of his children were treated successfully with mupirocin for impetigo as diagnosed by their pediatrician it follows that these lesions may also be impetigo.  Patient has been using his children's mupirocin twice a day, new prescription given for his own to be used 3 times a day.  Cormorbidities adding complexity: None noted  Social determinants of health affecting care: 2 children recently treated for impetigo  Shared decision making done by: The patient and myself          Disposition and Plan     Clinical Impression:  1. Impetigo         Disposition:  Discharge  11/9/2024  1:58 pm    Follow-up:  Junior Mendez MD  [address] [place] [id number]  [phone]    In 1  week            Medications Prescribed:  Discharge Medication List as of 11/9/2024  2:11 PM              Supplementary Documentation:

## 2024-11-09 NOTE — ED INITIAL ASSESSMENT (HPI)
Patient states that his son had impetigo recently and that it started to resolve with mupirocin and he started to use it when 5 days ago it appeared on several spots on his head neck and face but it has not resolved yet.

## 2024-11-09 NOTE — DISCHARGE INSTRUCTIONS
As we discussed it can take a week or longer for the lesions associated with impetigo to completely resolved.  Continue using the mupirocin three times a day.  When shaving dispose of the razor after you shave the area where the impetigo lesions are.  Strict handwashing is necessary to control the spread of infection.  If you develop any redness, swelling, pain surrounding the areas of impetigo this could indicate a secondary infection and further evaluation would be necessary at that time.  Be sure to follow-up with your primary care provider in about a week, sooner if the lesions are not improving or you have any other concerns.  Thank you for choosing our [facility] for your medical condition today. Please be sure to follow up with your primary care provider in 2 days if no improvement, or as directed. If any worsening of your symptoms or other concerns call your primary care provider, return here or go to the emergency department.

## 2024-11-19 ENCOUNTER — LAB ENCOUNTER (OUTPATIENT)
Dept: LAB | Age: 34
End: 2024-11-19
Attending: PHYSICIAN ASSISTANT
Payer: COMMERCIAL

## 2024-11-19 DIAGNOSIS — R73.03 PREDIABETES: ICD-10-CM

## 2024-11-19 DIAGNOSIS — E34.8 ENDOCRINE AXIS DYSFUNCTION: ICD-10-CM

## 2024-11-19 DIAGNOSIS — Z87.898 H/O ELEVATED HOMOCYSTEINE: ICD-10-CM

## 2024-11-19 DIAGNOSIS — E78.5 HYPERLIPIDEMIA, UNSPECIFIED HYPERLIPIDEMIA TYPE: ICD-10-CM

## 2024-11-19 LAB
CHOLEST SERPL-MCNC: 190 MG/DL (ref ?–200)
DHEA-S SERPL-MCNC: 234 UG/DL
EST. AVERAGE GLUCOSE BLD GHB EST-MCNC: 111 MG/DL (ref 68–126)
FASTING PATIENT LIPID ANSWER: YES
HBA1C MFR BLD: 5.5 % (ref ?–5.7)
HCYS SERPL-SCNC: 12.8 UMOL/L (ref 3.7–13.9)
HDLC SERPL-MCNC: 41 MG/DL (ref 40–59)
INSULIN SERPL-ACNC: 6.9 MU/L (ref 3–25)
LDLC SERPL CALC-MCNC: 124 MG/DL (ref ?–100)
NONHDLC SERPL-MCNC: 149 MG/DL (ref ?–130)
TRIGL SERPL-MCNC: 141 MG/DL (ref 30–149)
TSI SER-ACNC: 2.08 UIU/ML (ref 0.55–4.78)
VLDLC SERPL CALC-MCNC: 25 MG/DL (ref 0–30)

## 2024-11-19 PROCEDURE — 84443 ASSAY THYROID STIM HORMONE: CPT

## 2024-11-19 PROCEDURE — 83695 ASSAY OF LIPOPROTEIN(A): CPT

## 2024-11-19 PROCEDURE — 36415 COLL VENOUS BLD VENIPUNCTURE: CPT

## 2024-11-19 PROCEDURE — 83525 ASSAY OF INSULIN: CPT

## 2024-11-19 PROCEDURE — 84410 TESTOSTERONE BIOAVAILABLE: CPT

## 2024-11-19 PROCEDURE — 80061 LIPID PANEL: CPT

## 2024-11-19 PROCEDURE — 82172 ASSAY OF APOLIPOPROTEIN: CPT

## 2024-11-19 PROCEDURE — 83036 HEMOGLOBIN GLYCOSYLATED A1C: CPT

## 2024-11-19 PROCEDURE — 83090 ASSAY OF HOMOCYSTEINE: CPT

## 2024-11-19 PROCEDURE — 82627 DEHYDROEPIANDROSTERONE: CPT

## 2024-11-20 LAB — LIPOPROTEIN (A): 47.8 NMOL/L

## 2024-11-21 LAB — APOLIPOPROTEIN B: 99 MG/DL

## 2024-11-26 ENCOUNTER — OFFICE VISIT (OUTPATIENT)
Dept: OTOLARYNGOLOGY | Facility: CLINIC | Age: 34
End: 2024-11-26
Payer: COMMERCIAL

## 2024-11-26 DIAGNOSIS — R09.82 POSTNASAL DISCHARGE: Primary | ICD-10-CM

## 2024-11-26 PROCEDURE — 99213 OFFICE O/P EST LOW 20 MIN: CPT | Performed by: OTOLARYNGOLOGY

## 2024-11-26 RX ORDER — AZELASTINE 1 MG/ML
2 SPRAY, METERED NASAL 2 TIMES DAILY
Qty: 90 ML | Refills: 3 | Status: SHIPPED | OUTPATIENT
Start: 2024-11-26

## 2024-11-27 ENCOUNTER — TELEMEDICINE (OUTPATIENT)
Dept: INTEGRATIVE MEDICINE | Facility: CLINIC | Age: 34
End: 2024-11-27
Payer: COMMERCIAL

## 2024-11-27 DIAGNOSIS — E34.8 ENDOCRINE AXIS DYSFUNCTION: ICD-10-CM

## 2024-11-27 DIAGNOSIS — R73.03 PREDIABETES: ICD-10-CM

## 2024-11-27 DIAGNOSIS — E78.5 HYPERLIPIDEMIA, UNSPECIFIED HYPERLIPIDEMIA TYPE: Primary | ICD-10-CM

## 2024-11-27 DIAGNOSIS — Z87.898 H/O ELEVATED HOMOCYSTEINE: ICD-10-CM

## 2024-11-27 LAB
SEX HORM BIND GLOB: 52.2 NMOL/L
TESTOST % FREE+WEAK BND: 19.9 %
TESTOST FREE+WEAK BND: 144.7 NG/DL
TESTOSTERONE TOT /MS: 727.2 NG/DL

## 2024-11-27 PROCEDURE — 99214 OFFICE O/P EST MOD 30 MIN: CPT | Performed by: PHYSICIAN ASSISTANT

## 2024-11-27 NOTE — PATIENT INSTRUCTIONS
Discontinue the EstroDIM  Switch fromAdvaclear to the:  MethylCare™ by [employer]    MethylCare® is a comprehensive formula that features calcium L-5-methyltetrahydrofolate--a body-ready, nature identical folate--along with N-acetyl-L-cysteine, vitamins B6 and methylated B12, and betaine HCl. Together, these nutrients support methylation, which promotes healthy homocysteine metabolism.    Suggested Use:  Take 2 capsules once daily

## 2024-11-27 NOTE — PROGRESS NOTES
Adria Riggs is a 34 year old male.  Chief Complaint   Patient presents with    Follow - Up     Lab results        HPI:   34-year-old male patient presents today via telehealth for follow up.  Labs 11/19/24  Cholesterol improved!   Hcy higher  Lpa good  ApoB elevated  DHEA improved  Awaiting Testosterone    Cut out the red meats (only once weekly), more salmon, and less sugars  Started the BBR, advaclear, Sunfiber daily     ---------------------------Last OV 7/16/2024  -----------------------------   Since last OV:  Has been following the Paleo and 10x plan - eats very well. Red meat only 2-3x/wk, lots of veggies. Very little carbs, ni fruit.    GI - P.E. colostrum 3 TID has helped with acne and sensitive stomach - would need Tums a lot    EtOh - couple drinks socially approx 2weekends/mo; vodka/soda or beer    Energy is good  Elevated SHBG  - No symptoms  of low T    COMT snp but not the MTHFR    --------------------------------------------Last OV---------------------------------------------------    Hyperlipidemia on July labs and returns today after 3 months of eating better, exercising more, and reducing alcohol intake.      Did start on liver defend, but not on MitoCore or Sunfiber.    Started 10x Health with wife that includes genetic testing with nutrient specific recommendations.  Would like us to review the recommendations:     berberine balance 500mg BID,   Omega Plus 2800mg (4qd)  NAC 900mg liver support,   Ashwagandha 1200 for stress,   DIM BID and DHEA 25mg for hormone support,   --Boron to reduce SHBG - not on  --Vit D + K2  5000ius - not on   for DNA repair (carnitine), liver support  Colostrum - 30min before meals BID  B-Complex (don)   Nicotinamide  BPC-157 for skin acne  ..but has not started yet. Did just get the supplements.     Acne - started an IV (zinc, glutathione, ALA, biotin) in [place] and off the doxcycline. Feels it may have helped somewhat.     GI  - daily BM      REVIEW OF SYSTEMS:   Review of Systems     Negative except for pertinent ROS in HPI      FAMILY HISTORY:      Family History   Problem Relation Age of Onset    Anemia Father     Anemia Mother      No diabetes in the family  High cholesterol and high blood pressure  MEDICAL HISTORY:   History reviewed. No pertinent past medical history.    CURRENT MEDICATIONS:     Current Outpatient Medications   Medication Sig Dispense Refill    azelastine 0.1 % Nasal Solution 2 sprays by Nasal route 2 (two) times daily. 90 mL 3    azelastine 0.1 % Nasal Solution 2 sprays by Nasal route 2 (two) times daily. 3 each 3    pseudoephedrine  MG Oral Tablet 12 Hr Take 1 tablet (120 mg total) by mouth Q12H. (Patient not taking: Reported on 11/9/2024) 60 tablet 5    pseudoephedrine  MG Oral Tablet 12 Hr Take 1 tablet (120 mg total) by mouth every 12 (twelve) hours. (Patient not taking: Reported on 11/9/2024) 60 tablet 3       SOCIAL HISTORY:   Lifestyle Factors affecting health:  Diet - Paleo Sun night-Fri, cut out higher glycemic foods. Low carbs. PB protein bars. Turkey based chomp sticks.   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 No pertinent surgical history.    PHYSICAL EXAM:     There were no vitals filed for this visit.      Physical Exam  Constitutional:       General: He is not in acute distress.     Appearance:  Normal appearance. He is not ill-appearing or toxic-appearing.   HENT:      Head: Normocephalic and atraumatic.   Eyes:      Extraocular Movements: Extraocular movements intact.   Pulmonary:      Effort: Pulmonary effort is normal. No respiratory distress.   Musculoskeletal:      Cervical back: Normal range of motion.   Skin:     Coloration: Skin is not jaundiced.      Findings: No lesion.   Neurological:      Mental Status: He is alert and oriented to person, place, and time.   Psychiatric:         Mood and Affect: Mood normal.         Behavior: Behavior normal.         Thought Content: Thought content normal.         Judgment: Judgment normal.          ASSESSMENT AND PLAN:     [facility] Lab Encounter on 11/19/2024   Component Date Value Ref Range Status    Cholesterol, Total 11/19/2024 190  <200 mg/dL Final    Desirable  <200 mg/dL  Borderline  200-239 mg/dL  High      >=240 mg/dL        HDL Cholesterol 11/19/2024 41  40 - 59 mg/dL Final    Interpretive Information:   An HDL cholesterol <40 mg/dL is low and constitutes a coronary heart disease risk factor. An HDL cholesterol >60 mg/dL is a negative risk factor for coronary heart disease.        Triglycerides 11/19/2024 141  30 - 149 mg/dL Final    Reference interval for fasting triglycerides  Desirable: <150 mg/dL  Borderline: 150-199 mg/dL  High: 200-499 mg/dL  Very High: >=500 mg/dL          LDL Cholesterol 11/19/2024 124 (H)  <100 mg/dL Final    Optimal            <100 mg/dL   Near/Above OptimaL 100-129 mg/dL   Borderline High    130-159 mg/dL   High               160-189 mg/dL    Very High          >190 mg/dL         VLDL 11/19/2024 25  0 - 30 mg/dL Final    Non HDL Chol 11/19/2024 149 (H)  <130 mg/dL Final    Desirable  <130 mg/dL   Borderline  130-159 mg/dL   High        160-189 mg/dL       Very high >=190 mg/dL        Patient Fasting for Lipid? 11/19/2024 Yes   Final    HgbA1C 11/19/2024 5.5  <5.7 % Final     Normal HbA1C:     <5.7%      Pre-Diabetic:     5.7 -  6.4%      Diabetic:         >6.4%      Diabetic Control: <7.0%        Estimated Average Glucose 11/19/2024 111  68 - 126 mg/dL Final    eAG is the estimated average glucose calculated from Hgb A1c according to the formula recommended by the American Diabetes Association. eAG levels reflect the long term average glucose and may not correlate with random or fasting glucose levels since these represent specific points in time.           Insulin 11/19/2024 6.9  3.0 - 25.0 mU/L Final    TSH 11/19/2024 2.075  0.550 - 4.780 uIU/mL Final    Homocysteine 11/19/2024 12.8  3.7 - 13.9 umol/L Final    DHEA Sulfate 11/19/2024 234.0  34.5 - 568.9 ug/dL Final    This test may exhibit interference of greater than 10% when a sample is collected from a person who is consuming high dose of biotin (a.k.a., vitamin B7, vitamin H, coenzyme R) supplements resulting in serum concentrations &gt;12.5 ng/mL, leading to either falsely elevated or falsely depressed results.  Intake of the recommended daily allowance (RDA) for biotin (0.03 mg) has not been shown to typically cause significant interference; however, high dose daily dietary supplements may contain biotin concentrations greater than 150 times (5-10 mg) the RDA.  It is recommended that physicians ask all patients who may be on biotin supplementation to stop biotin consumption at least 72 hours prior to collection of a new sample.    Apolipoprotein B 11/19/2024 99 (H)  <90 mg/dL Final                             Desirable               < 90                           Borderline High     90 -  99                           High               100 - 130                           Very High               >130       --------------------------------------------------            ASCVD RISK              THERAPEUTIC TARGET             CATEGORY                  APO B (mg/dL)          Very High Risk        <80 (if extreme risk <70)          High Risk             <90          Moderate Risk          <90    Lipoprotein (a) 11/19/2024 47.8  <75.0 nmol/L Final    Note:  Values greater than or equal to 75.0 nmol/L may         indicate an independent risk factor for CHD,         but must be evaluated with caution when applied         to non- populations due to the         influence of genetic factors on Lp(a) across         ethnicities.      No results found.    1. Hyperlipidemia, unspecified hyperlipidemia type  - Lipid Panel; Future  - Apolipoprotein B [E]; Future    2. Prediabetes  - Hemoglobin A1C; Future  - Insulin; Future    3. H/O elevated homocysteine  - Homocysteine; Future    4. Endocrine axis dysfunction  - Dehydroepiandrosterone Sulfate; Future  - Testosterone,Total and Weakly Bound w/ SHBG; Future          This visit was conducted using Telemedicine with live, interactive video and audio.   The patient understands the risks and benefits of Telemedicine and that a Telemedicine visit limits the ability to perform a thorough physical examination which may affect objective findings related to specific symptoms and conditions which can, in turn, affect treatment.     The patient was located in the [facility] at the time of the encounter.    Time spent with patient: Over 30 minutes spent in chart review and in direct communication with patient obtaining and reviewing history, creating a unique care plan, explaining the rationale for treatment, reviewing potential SE and overall treatment plan,  documenting all clinical information in Epic. Over 50% of this time was in education, counseling and coordination of care.     Problem List Items Addressed This Visit    None  Visit Diagnoses       Hyperlipidemia, unspecified hyperlipidemia type    -  Primary    Relevant Orders    Lipid Panel    Apolipoprotein B [E]    Prediabetes        Relevant Orders    Hemoglobin A1C    Insulin    H/O elevated homocysteine        Relevant Orders    Homocysteine    Endocrine axis dysfunction        Relevant Orders     Dehydroepiandrosterone Sulfate    Testosterone,Total and Weakly Bound w/ SHBG          Reviewed lab results in detail and answered all questions.    Cardiometabolic:  Hyperlipidemia - Lipid markers improved with lifestyle changes, especially reducing red meat and increasing vegetables, starting the fiber daily.    ->Rec looking in to Dr. Jose Campos for further dietary guidance  ApoB was elevated, LP(a) WNL  -> Recheck lipids in 6mos    Hemoglobin A1c stable  -> TSH slightly increased but free T4 and free T3 are in good range.    Homocysteine above optimal range, recommend switching to MethylCare to support.     Endocrine:  DHEA has improved on supplementation and still awaiting results for total testosterone levels.  -> Okay to discontinue estroDIM    Given further recommendations as below    Orders Placed This Visit:  Orders Placed This Encounter   Procedures    Lipid Panel    Dehydroepiandrosterone Sulfate    Testosterone,Total and Weakly Bound w/ SHBG    Homocysteine    Hemoglobin A1C    Insulin    Apolipoprotein B [E]     No orders of the defined types were placed in this encounter.      Patient Instructions   Discontinue the EstroDIM  Switch fromAdvaclear to the:  MethylCare™ by [employer]ric[employer] Assistant    MethylCare® is a comprehensive formula that features calcium L-5-methyltetrahydrofolate--a body-ready, nature identical folate--along with N-acetyl-L-cysteine, vitamins B6 and methylated B12, and betaine HCl. Together, these nutrients support methylation, which promotes healthy homocysteine metabolism.    Suggested Use:  Take 2 capsules once daily     Return in about 6 months (around 5/27/2025) for x30min lab results - hyperlipid, prediabetes.    Patient affirmed understanding of plan and all questions were answered.     Leigh Ann Hoff PA-C

## 2024-11-27 NOTE — PROGRESS NOTES
Adria Riggs is a 34 year old male.    Chief Complaint   Patient presents with    Sinus Problem     Patient is here due post nasal drip follow up        HISTORY OF PRESENT ILLNESS  He presents with long history of ear issues.  He has had earwax problems in the past has used Debrox in the past but now more recently using mineral oil.  Feels that he gets water in the back of his ear canals and irrigates his ears routinely to get water out.  Turns his head feels like water is moving his ear never really sees a dripping.  Also complains of constant postnasal discharge has tried fluticasone and currently on Xyzal.  Does not seem to be helping too much.  No other signs, symptoms or complaints.     7/22/22 last visit started on Singulair loratadine D and Astelin nasal spray.  He is done very well and within a matter of days states that his symptoms resolved with no ear issues or nasal issues.  Breathing much better feels great at this time.  Astelin stopped and started on some other type of grapefruit extract a to help with his immune system.  Allergy testing of the blood performed by his primary care physician with finding of low normal IgE level.  Has not had any other immunoglobulin testing at this time but is scheduled to have some performed in the near future.     12/5/22 actually doing quite well.  Only using Sudafed and Astelin right now not really using Xyzal and stopped using Singulair about a month and a half ago as he was using it twice a day unfortunately.  Here for refills on Sudafed Singulair and Astelin nasal spray.  No other complaints or concerns.  Immunoglobulin work-up was negative using grapefruit seed extract on the recommendation of his doctor.  Not sure if it is really helping or not no other signs, symptoms or complaints.     12/5/23 doing quite well.  He has only been using Astelin at this time.  He stopped using montelukast about 5 months ago and gets by with Astelin twice daily.  No  bleeding from the nose no blood on Kleenex when he blows his nose feels really good very happy with the results of simply using the spray.  Has questions about the safety of using spray daily.  Here for routine examination    11/26/24 using Astelin nasal spray essentially on a daily basis she seems to help with his congestive issues and postnasal discharge.  No real problems with no acute sinus infections throughout the last year.  Here for yearly follow-up and to get refills on his Astelin.  Uses Singulair and Sudafed on a as needed basis.    Social History     Socioeconomic History    Marital status:    Tobacco Use    Smoking status: Never     Passive exposure: Never    Smokeless tobacco: Never   Vaping Use    Vaping status: Never Used   Substance and Sexual Activity    Alcohol use: Yes     Comment: social    Drug use: Never   Other Topics Concern    Special Diet No       Family History   Problem Relation Age of Onset    Anemia Father     Anemia Mother        History reviewed. No pertinent past medical history.    History reviewed. No pertinent surgical history.      REVIEW OF SYSTEMS    System Neg/Pos Details   Constitutional Negative Fatigue, fever and weight loss.   ENMT Negative Drooling.   Eyes Negative Blurred vision and vision changes.   Respiratory Negative Dyspnea and wheezing.   Cardio Negative Chest pain, irregular heartbeat/palpitations and syncope.   GI Negative Abdominal pain and diarrhea.   Endocrine Negative Cold intolerance and heat intolerance.   Neuro Negative Tremors.   Psych Negative Anxiety and depression.   Integumentary Negative Frequent skin infections, pigment change and rash.   Hema/Lymph Negative Easy bleeding and easy bruising.           PHYSICAL EXAM    There were no vitals taken for this visit.       Constitutional Normal Overall appearance - Normal.   Psychiatric Normal Orientation - Oriented to time, place, person & situation. Appropriate mood and affect.   Neck Exam Normal  Inspection - Normal. Palpation - Normal. Parotid gland - Normal. Thyroid gland - Normal.   Eyes Normal Conjunctiva - Right: Normal, Left: Normal. Pupil - Right: Normal, Left: Normal. Fundus - Right: Normal, Left: Normal.   Neurological Normal Memory - Normal. Cranial nerves - Cranial nerves II through XII grossly intact.   Head/Face Normal Facial features - Normal. Eyebrows - Normal. Skull - Normal.        Nasopharynx Normal External nose - Normal. Lips/teeth/gums - Normal. Tonsils - Normal. Oropharynx - Normal.   Ears Normal Inspection - Right: Normal, Left: Normal. Canal - Right: Normal, Left: Normal. TM - Right: Normal, Left: Normal.   Skin Normal Inspection - Normal.        Lymph Detail Normal Submental. Submandibular. Anterior cervical. Posterior cervical. Supraclavicular.        Nose/Mouth/Throat Normal External nose - Normal. Lips/teeth/gums - Normal. Tonsils - Normal. Oropharynx - Normal.   Nose/Mouth/Throat Normal Nares - Right: Normal Left: Normal. Septum -slight deviation turbinates - Right: Normal, Left: Normal.       Current Outpatient Medications:     azelastine 0.1 % Nasal Solution, 2 sprays by Nasal route 2 (two) times daily., Disp: 90 mL, Rfl: 3    azelastine 0.1 % Nasal Solution, 2 sprays by Nasal route 2 (two) times daily., Disp: 3 each, Rfl: 3    Continuous Glucose Sensor (FREESTYLE ANA 3 SENSOR) Does not apply Misc, 1 each every 14 (fourteen) days. (Patient not taking: Reported on 11/9/2024), Disp: 1 each, Rfl: 0    Continuous Glucose  (FREESTYLE ANA 3 READER) Does not apply Misc, 1 each every 14 (fourteen) days. (Patient not taking: Reported on 11/9/2024), Disp: 1 each, Rfl: 0    azelastine 0.1 % Nasal Solution, 2 sprays by Nasal route 2 (two) times daily. (Patient not taking: Reported on 11/9/2024), Disp: 90 mL, Rfl: 3    Tazarotene 0.1 % External Cream, , Disp: , Rfl:     cephalexin 500 MG Oral Cap, , Disp: , Rfl:     pseudoephedrine  MG Oral Tablet 12 Hr, Take 1 tablet (120  mg total) by mouth every 12 (twelve) hours. (Patient not taking: Reported on 11/9/2024), Disp: 60 tablet, Rfl: 3    montelukast 10 MG Oral Tab, Take 1 tablet (10 mg total) by mouth nightly. (Patient not taking: Reported on 11/9/2024), Disp: 30 tablet, Rfl: 0    HYDROcodone-acetaminophen 5-325 MG Oral Tab, Take 1-2 tablets by mouth every 4 (four) hours as needed for Pain. (Patient not taking: Reported on 11/9/2024), Disp: 20 tablet, Rfl: 0    pseudoephedrine  MG Oral Tablet 12 Hr, Take 1 tablet (120 mg total) by mouth Q12H. (Patient not taking: Reported on 11/9/2024), Disp: 60 tablet, Rfl: 5    AZELASTINE 0.1 % Nasal Solution, USE 2 SPRAYS IN EACH NOSTRIL TWICE DAILY, Disp: 90 mL, Rfl: 1    pseudoephedrine  MG Oral Tablet 12 Hr, Take 1 tablet (120 mg total) by mouth every 12 (twelve) hours. (Patient not taking: Reported on 11/9/2024), Disp: 60 tablet, Rfl: 3    clindamycin 1 % External Lotion, , Disp: , Rfl:     fluticasone propionate 50 MCG/ACT Nasal Suspension, 1 spray by Nasal route daily. (Patient not taking: Reported on 11/9/2024), Disp: , Rfl:     Clindamycin Phosphate 1 % External Gel, , Disp: , Rfl:   ASSESSMENT AND PLAN    1. Postnasal discharge  Postnasal discharge appears to be under very good control with the use of Astelin nasal spray.  Uses Singulair and Sudafed as needed.  I have asked him to continue to medications and if he notes any bleeding from the nose he should use Vaseline bilaterally.  Return to see me in 1 year for refills.  90-day supplies called in with 3 refills.        This note was prepared using Dragon [employer] voice recognition dictation software. As a result errors may occur. When identified these errors have been corrected. While every attempt is made to correct errors during dictation discrepancies may still exist    Hiram Maki MD    11/26/2024    11:40 PM

## 2025-05-06 ENCOUNTER — PATIENT MESSAGE (OUTPATIENT)
Dept: INTEGRATIVE MEDICINE | Facility: CLINIC | Age: 35
End: 2025-05-06

## 2025-05-09 ENCOUNTER — LAB ENCOUNTER (OUTPATIENT)
Dept: LAB | Facility: REFERENCE LAB | Age: 35
End: 2025-05-09
Attending: PHYSICIAN ASSISTANT
Payer: COMMERCIAL

## 2025-05-09 DIAGNOSIS — E34.8 ENDOCRINE AXIS DYSFUNCTION: ICD-10-CM

## 2025-05-09 DIAGNOSIS — E78.5 HYPERLIPIDEMIA, UNSPECIFIED HYPERLIPIDEMIA TYPE: ICD-10-CM

## 2025-05-09 DIAGNOSIS — Z87.898 H/O ELEVATED HOMOCYSTEINE: ICD-10-CM

## 2025-05-09 DIAGNOSIS — R73.03 PREDIABETES: ICD-10-CM

## 2025-05-09 LAB
CHOLEST SERPL-MCNC: 175 MG/DL (ref ?–200)
DHEA-S SERPL-MCNC: 226.5 UG/DL (ref 34.5–568.9)
EST. AVERAGE GLUCOSE BLD GHB EST-MCNC: 114 MG/DL (ref 68–126)
FASTING PATIENT LIPID ANSWER: YES
HBA1C MFR BLD: 5.6 % (ref ?–5.7)
HCYS SERPL-SCNC: 11.1 UMOL/L (ref 3.7–13.9)
HDLC SERPL-MCNC: 42 MG/DL (ref 40–59)
INSULIN SERPL-ACNC: 7.1 MU/L (ref 3–25)
LDLC SERPL CALC-MCNC: 111 MG/DL (ref ?–100)
NONHDLC SERPL-MCNC: 133 MG/DL (ref ?–130)
TRIGL SERPL-MCNC: 123 MG/DL (ref 30–149)
VLDLC SERPL CALC-MCNC: 21 MG/DL (ref 0–30)

## 2025-05-09 PROCEDURE — 36415 COLL VENOUS BLD VENIPUNCTURE: CPT

## 2025-05-09 PROCEDURE — 82172 ASSAY OF APOLIPOPROTEIN: CPT

## 2025-05-09 PROCEDURE — 84410 TESTOSTERONE BIOAVAILABLE: CPT

## 2025-05-09 PROCEDURE — 83090 ASSAY OF HOMOCYSTEINE: CPT

## 2025-05-09 PROCEDURE — 82627 DEHYDROEPIANDROSTERONE: CPT

## 2025-05-09 PROCEDURE — 80061 LIPID PANEL: CPT

## 2025-05-09 PROCEDURE — 83036 HEMOGLOBIN GLYCOSYLATED A1C: CPT

## 2025-05-09 PROCEDURE — 83525 ASSAY OF INSULIN: CPT

## 2025-05-12 LAB — APOLIPOPROTEIN B: 93 MG/DL

## 2025-05-14 LAB
SEX HORM BIND GLOB: 56.4 NMOL/L
TESTOST % FREE+WEAK BND: 17.4 %
TESTOST FREE+WEAK BND: 163.1 NG/DL
TESTOSTERONE TOT /MS: 937.2 NG/DL

## 2025-06-24 ENCOUNTER — PATIENT MESSAGE (OUTPATIENT)
Dept: INTEGRATIVE MEDICINE | Facility: CLINIC | Age: 35
End: 2025-06-24

## 2025-07-23 ENCOUNTER — TELEMEDICINE (OUTPATIENT)
Dept: INTEGRATIVE MEDICINE | Facility: CLINIC | Age: 35
End: 2025-07-23
Payer: COMMERCIAL

## 2025-07-23 DIAGNOSIS — E78.5 HYPERLIPIDEMIA, UNSPECIFIED HYPERLIPIDEMIA TYPE: Primary | ICD-10-CM

## 2025-07-23 DIAGNOSIS — R73.03 PREDIABETES: ICD-10-CM

## 2025-07-23 DIAGNOSIS — Z87.898 H/O ELEVATED HOMOCYSTEINE: ICD-10-CM

## 2025-07-23 DIAGNOSIS — E34.8 ENDOCRINE AXIS DYSFUNCTION: ICD-10-CM

## 2025-07-23 DIAGNOSIS — R79.89 LOW VITAMIN D LEVEL: ICD-10-CM

## 2025-07-23 NOTE — PATIENT INSTRUCTIONS
Lab order placed. Please get blood drawn in Jan -  go in the morning before 10am after fasting overnight.  -Please hold vitamins 24hours prior to lab draw. (If you are taking Biotin, hold 72hours prior to lab draw).

## 2025-07-23 NOTE — PROGRESS NOTES
Adria Riggs is a 35 year old male.  Chief Complaint   Patient presents with    Follow - Up     Patient presents  for follow up.        HPI:   34-year-old male patient presents today via telehealth for follow up on   labs 5/9/25:   -Testosterone is high normal, DHEA is good   - Cholesterol continues to improve, but apolipoprotein B still elevated   - Hemoglobin A1c high normal, but insulin is good   - Homocystine still slightly up but improved,  taking the MethylCare    Diet - Has been following the Paleo and 10x plan - eats very well.   Red meat down to 1x/wk, lots of veggies. Very little carbs, no fruit.  Sunfiber every morning.  Protein shakes & turkey/beef stick in AM  Salad/Webb, eggs for lunch     GI - P.E. colostrum 3 TID has helped with acne and sensitive stomach     Diet - maybe not as great due to eating out more with new position  EtOh - little more w/dinners out; vodka/soda or beer    Energy is good  Elevated SHBG, but T high normal     COMT snp but not the MTHFR    Sleep - well; 9:30-5:30  Stress - high; takes ashwagandha at night  Exercise down to 2d/wk with job change (now ) and moving to [place] in the Fall    --------------------------------------------Last OV---------------------------------------------------    Labs 11/19/24  Cholesterol improved!   Hcy higher  Lpa good  ApoB elevated  DHEA improved  Awaiting Testosterone    Cut out the red meats (only once weekly), more salmon, and less sugars  Started the BBR, advaclear, Sunfiber daily     ---------------------------Last OV 7/16/2024  -----------------------------   Since last OV:  Has been following the Paleo and 10x plan - eats very well. Red meat only 2-3x/wk, lots of veggies. Very little carbs, ni fruit.    GI - P.E. colostrum 3 TID has helped with acne and sensitive stomach - would need Tums a lot    EtOh - couple drinks socially approx 2weekends/mo; vodka/soda or beer    Energy is good  Elevated SHBG  - No symptoms  of low  T    COMT snp but not the MTHFR    --------------------------------------------Last OV---------------------------------------------------    Hyperlipidemia on July labs and returns today after 3 months of eating better, exercising more, and reducing alcohol intake.      Did start on liver defend, but not on MitoCore or Sunfiber.    Started 10x Health with wife that includes genetic testing with nutrient specific recommendations.  Would like us to review the recommendations:     berberine balance 500mg BID,   Omega Plus 2800mg (4qd)  NAC 900mg liver support,   Ashwagandha 1200 for stress,   DIM BID and DHEA 25mg for hormone support,   --Boron to reduce SHBG - not on  --Vit D + K2  5000ius - not on   for DNA repair (carnitine), liver support  Colostrum - 30min before meals BID  B-Complex (don)   Nicotinamide  BPC-157 for skin acne  ..but has not started yet. Did just get the supplements.     Acne - started an IV (zinc, glutathione, ALA, biotin) in Iron and off the doxcycline. Feels it may have helped somewhat.     GI  - daily BM     REVIEW OF SYSTEMS:   Review of Systems     Negative except for pertinent ROS in HPI      FAMILY HISTORY:      Family History   Problem Relation Age of Onset    Anemia Father     Anemia Mother      No diabetes in the family  High cholesterol and high blood pressure  MEDICAL HISTORY:   History reviewed. No pertinent past medical history.    CURRENT MEDICATIONS:     Current Outpatient Medications   Medication Sig Dispense Refill    azelastine 0.1 % Nasal Solution 2 sprays by Nasal route 2 (two) times daily. 90 mL 3    azelastine 0.1 % Nasal Solution 2 sprays by Nasal route 2 (two) times daily. 3 each 3       SOCIAL HISTORY:   Lifestyle Factors affecting health:  Diet - Paleo Sun night-Fri, cut out higher glycemic foods. Low carbs. PB protein bars. Turkey based chomp sticks.   Hydrates well; cocktails on weekend    Exercise - regular 2d/wk,  after he moves. 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 No pertinent surgical history.    PHYSICAL EXAM:     There were no vitals filed for this visit.      Physical Exam  Constitutional:       General: He is not in acute distress.     Appearance: Normal appearance. He is not ill-appearing or toxic-appearing.   HENT:      Head: Normocephalic and atraumatic.   Eyes:      Extraocular Movements: Extraocular movements intact.   Pulmonary:      Effort: Pulmonary effort is normal. No respiratory distress.   Musculoskeletal:      Cervical back: Normal range of motion.   Skin:     Coloration: Skin is not jaundiced.      Findings: No lesion.   Neurological:      Mental Status: He is alert and oriented to person, place, and time.   Psychiatric:         Mood and Affect: Mood normal.         Behavior: Behavior normal.         Thought Content: Thought content normal.         Judgment: Judgment normal.          ASSESSMENT AND PLAN:     [facility] Lab Encounter on 05/09/2025   Component Date Value Ref Range Status    Cholesterol, Total 05/09/2025 175  <200 mg/dL Final    Desirable  <200 mg/dL  Borderline  200-239 mg/dL  High      >=240 mg/dL        HDL Cholesterol 05/09/2025 42  40 - 59 mg/dL Final    Interpretive Information:   An HDL cholesterol <40 mg/dL is low and constitutes a coronary heart disease risk factor. An HDL cholesterol >60 mg/dL is a negative risk factor for coronary heart disease.        Triglycerides 05/09/2025  123  30 - 149 mg/dL Final    Reference interval for fasting triglycerides  Desirable: <150 mg/dL  Borderline: 150-199 mg/dL  High: 200-499 mg/dL  Very High: >=500 mg/dL          LDL Cholesterol 05/09/2025 111 (H)  <100 mg/dL Final    Optimal            <100 mg/dL   Near/Above OptimaL 100-129 mg/dL   Borderline High    130-159 mg/dL   High               160-189 mg/dL    Very High          >190 mg/dL       LDL calculated using the [facility] calculation.    VLDL 05/09/2025 21  0 - 30 mg/dL Final    Non HDL Chol 05/09/2025 133 (H)  <130 mg/dL Final    Desirable  <130 mg/dL   Borderline  130-159 mg/dL   High        160-189 mg/dL       Very high >=190 mg/dL        Patient Fasting for Lipid? 05/09/2025 Yes   Final    DHEA Sulfate 05/09/2025 226.5  34.5 - 568.9 ug/dL Final    This test may exhibit interference of greater than 10% when a sample is collected from a person who is consuming high dose of biotin (a.k.a., vitamin B7, vitamin H, coenzyme R) supplements resulting in serum concentrations &gt;12.5 ng/mL, leading to either falsely elevated or falsely depressed results.  Intake of the recommended daily allowance (RDA) for biotin (0.03 mg) has not been shown to typically cause significant interference; however, high dose daily dietary supplements may contain biotin concentrations greater than 150 times (5-10 mg) the RDA.  It is recommended that physicians ask all patients who may be on biotin supplementation to stop biotin consumption at least 72 hours prior to collection of a new sample.    Testosterone Tot LC/MS 05/09/2025 937.2 (H)  264.0 - 916.0 ng/dL Final    This [facility] LC/MS-MS method is currently certified by the CDC  Hormone Standardization Program (HoSt). Adult male reference  interval is based on a population of healthy nonobese males  (BMI <30) between 19 and 39 years old. Deisy et.al. JCEM  2017,102;5848-9270. PMID: 57089679.    Testost % Free+Weak Bnd 05/09/2025 17.4  9.0 - 46.0 % Final    This test  was developed and its performance characteristics  determined by [employer]. It has not been cleared or approved  by the Food and Drug Administration.    Testost Free+Weak Bnd 05/09/2025 163.1  40.0 - 250.0 ng/dL Final    Sex Horm Bind Glob 05/09/2025 56.4 (H)  16.5 - 55.9 nmol/L Final    Homocysteine 05/09/2025 11.1  3.7 - 13.9 umol/L Final    HgbA1C 05/09/2025 5.6  <5.7 % Final     Normal HbA1C:     <5.7%      Pre-Diabetic:     5.7 - 6.4%      Diabetic:         >6.4%      Diabetic Control: <7.0%        Estimated Average Glucose 05/09/2025 114  68 - 126 mg/dL Final    eAG is the estimated average glucose calculated from Hgb A1c according to the formula recommended by the American Diabetes Association. eAG levels reflect the long term average glucose and may not correlate with random or fasting glucose levels since these represent specific points in time.           Insulin 05/09/2025 7.1  3.0 - 25.0 mU/L Final    Apolipoprotein B 05/09/2025 93 (H)  <90 mg/dL Final                             Desirable               < 90                           Borderline High     90 -  99                           High               100 - 130                           Very High               >130       --------------------------------------------------            ASCVD RISK              THERAPEUTIC TARGET             CATEGORY                  APO B (mg/dL)          Very High Risk        <80 (if extreme risk <70)          High Risk             <90          Moderate Risk         <90      No results found.    1. Hyperlipidemia, unspecified hyperlipidemia type  - CT CALCIUM SCORING; Future  - Lipid Panel; Future  - Comp Metabolic Panel (14); Future  - CBC With Differential With Platelet; Future  - Apolipoprotein B [E]; Future    2. Prediabetes  - Insulin; Future  - Hemoglobin A1C; Future  - Comp Metabolic Panel (14); Future  - CBC With Differential With Platelet; Future    3. H/O elevated homocysteine  - Homocysteine; Future  - Vitamin  B12; Future  - Folic Acid Serum (Folate); Future    4. Endocrine axis dysfunction  - Dehydroepiandrosterone Sulfate; Future  - Pregnenolone by MS/MS, Serum; Future  - Testosterone,Total and Weakly Bound w/ SHBG; Future  - Comp Metabolic Panel (14); Future  - CBC With Differential With Platelet; Future  - Estradiol; Future    5. Low vitamin D level  - Vitamin D; Future            This visit was conducted using Telemedicine with live, interactive video and audio.   The patient understands the risks and benefits of Telemedicine and that a Telemedicine visit limits the ability to perform a thorough physical examination which may affect objective findings related to specific symptoms and conditions which can, in turn, affect treatment.     The patient was located in the [facility] at the time of the encounter.    Time spent with patient: Over 30 minutes spent in chart review and in direct communication with patient obtaining and reviewing history, creating a unique care plan, explaining the rationale for treatment, reviewing potential SE and overall treatment plan,  documenting all clinical information in Epic. Over 50% of this time was in education, counseling and coordination of care.     Problem List Items Addressed This Visit    None  Visit Diagnoses         Hyperlipidemia, unspecified hyperlipidemia type    -  Primary    Relevant Orders    CT CALCIUM SCORING    Lipid Panel    Comp Metabolic Panel (14)    CBC With Differential With Platelet    Apolipoprotein B [E]      Prediabetes        Relevant Orders    Insulin    Hemoglobin A1C    Comp Metabolic Panel (14)    CBC With Differential With Platelet      H/O elevated homocysteine        Relevant Orders    Homocysteine    Vitamin B12    Folic Acid Serum (Folate)      Endocrine axis dysfunction        Relevant Orders    Dehydroepiandrosterone Sulfate    Pregnenolone by MS/MS, Serum    Testosterone,Total and Weakly Bound w/ SHBG    Comp Metabolic Panel (14)    CBC  With Differential With Platelet    Estradiol      Low vitamin D level        Relevant Orders    Vitamin D            Reviewed lab results in detail and answered all questions.    Cardiometabolic:  Hyperlipidemia - Lipid markers improved with lifestyle changes, especially reducing red meat and increasing vegetables, starting the fiber daily.    ApoB was elevated, LP(a) WNL  -> Recheck lipids in Jan  -> Recommend CT heart    Hemoglobin A1c slightly bumped up to 5.6, may just reflect lifestyle changes  -> Consider CGM in the future should it raise any more    Homocysteine above optimal range, continue MethylCare to support.     Endocrine:  DHEA has improved on supplementation, testosterone high normal levels.  Was on estroDIM in the past.    Given further recommendations as below    Orders Placed This Visit:  Orders Placed This Encounter   Procedures    Dehydroepiandrosterone Sulfate    Vitamin D    Insulin    Hemoglobin A1C    Pregnenolone by MS/MS, Serum    Homocysteine    Lipid Panel    Testosterone,Total and Weakly Bound w/ SHBG    Comp Metabolic Panel (14)    CBC With Differential With Platelet    Vitamin B12    Folic Acid Serum (Folate)    Apolipoprotein B [E]    Estradiol     No orders of the defined types were placed in this encounter.      Patient Instructions   Lab order placed. Please get blood drawn in Jan -  go in the morning before 10am after fasting overnight.  -Please hold vitamins 24hours prior to lab draw. (If you are taking Biotin, hold 72hours prior to lab draw).           Return in about 6 months (around 1/23/2026) for x60min lab results.    Patient affirmed understanding of plan and all questions were answered.     Leigh Ann Hoff PA-C

## (undated) NOTE — Clinical Note
Please call labs and find out how to order the following     IgG total   IgA total   IgE total   IgG subclass panel     Follow up in 1 year

## (undated) NOTE — Clinical Note
Order for the following placed as MISC order: I cannot find them in the epic data base. IgG total - [id number]   IgA total - [id number]   IgE total - [id number]   IgG subclass panel - [id number]      Please fax or let lab know if this will work.